# Patient Record
Sex: MALE | Race: BLACK OR AFRICAN AMERICAN | NOT HISPANIC OR LATINO | URBAN - METROPOLITAN AREA
[De-identification: names, ages, dates, MRNs, and addresses within clinical notes are randomized per-mention and may not be internally consistent; named-entity substitution may affect disease eponyms.]

---

## 2021-01-01 ENCOUNTER — EMERGENCY (EMERGENCY)
Facility: HOSPITAL | Age: 66
LOS: 1 days | Discharge: HOME | End: 2021-01-01
Attending: EMERGENCY MEDICINE | Admitting: EMERGENCY MEDICINE
Payer: MEDICARE

## 2021-01-01 ENCOUNTER — INPATIENT (INPATIENT)
Facility: HOSPITAL | Age: 66
LOS: 0 days | End: 2021-06-17
Attending: INTERNAL MEDICINE | Admitting: INTERNAL MEDICINE
Payer: MEDICARE

## 2021-01-01 VITALS
OXYGEN SATURATION: 99 % | DIASTOLIC BLOOD PRESSURE: 105 MMHG | HEART RATE: 73 BPM | HEIGHT: 71 IN | SYSTOLIC BLOOD PRESSURE: 148 MMHG | RESPIRATION RATE: 16 BRPM

## 2021-01-01 VITALS
HEART RATE: 75 BPM | HEIGHT: 71 IN | DIASTOLIC BLOOD PRESSURE: 102 MMHG | OXYGEN SATURATION: 98 % | RESPIRATION RATE: 16 BRPM | SYSTOLIC BLOOD PRESSURE: 157 MMHG | TEMPERATURE: 98 F

## 2021-01-01 VITALS
DIASTOLIC BLOOD PRESSURE: 96 MMHG | OXYGEN SATURATION: 100 % | HEART RATE: 85 BPM | SYSTOLIC BLOOD PRESSURE: 155 MMHG | RESPIRATION RATE: 18 BRPM

## 2021-01-01 VITALS — HEART RATE: 57 BPM | OXYGEN SATURATION: 93 % | RESPIRATION RATE: 12 BRPM

## 2021-01-01 VITALS
HEART RATE: 89 BPM | RESPIRATION RATE: 15 BRPM | HEIGHT: 71 IN | OXYGEN SATURATION: 100 % | TEMPERATURE: 97 F | DIASTOLIC BLOOD PRESSURE: 69 MMHG | SYSTOLIC BLOOD PRESSURE: 100 MMHG

## 2021-01-01 DIAGNOSIS — R00.0 TACHYCARDIA, UNSPECIFIED: ICD-10-CM

## 2021-01-01 LAB
ALBUMIN SERPL ELPH-MCNC: 2 G/DL — LOW (ref 3.3–5)
ALBUMIN SERPL ELPH-MCNC: 2 G/DL — LOW (ref 3.3–5)
ALBUMIN SERPL ELPH-MCNC: 3.3 G/DL — SIGNIFICANT CHANGE UP (ref 3.3–5)
ALP SERPL-CCNC: 111 U/L — SIGNIFICANT CHANGE UP (ref 40–120)
ALP SERPL-CCNC: 131 U/L — HIGH (ref 40–120)
ALP SERPL-CCNC: 160 U/L — HIGH (ref 40–120)
ALT FLD-CCNC: 128 U/L — HIGH (ref 4–41)
ALT FLD-CCNC: 178 U/L — HIGH (ref 4–41)
ALT FLD-CCNC: 394 U/L — HIGH (ref 4–41)
ANION GAP SERPL CALC-SCNC: 18 MMOL/L — HIGH (ref 7–14)
ANION GAP SERPL CALC-SCNC: 31 MMOL/L — HIGH (ref 7–14)
ANION GAP SERPL CALC-SCNC: >35 MMOL/L — HIGH (ref 7–14)
ANISOCYTOSIS BLD QL: SLIGHT — SIGNIFICANT CHANGE UP
APTT BLD: 40.7 SEC — HIGH (ref 27–36.3)
APTT BLD: 48.5 SEC — HIGH (ref 27–36.3)
AST SERPL-CCNC: 128 U/L — HIGH (ref 4–40)
AST SERPL-CCNC: 203 U/L — HIGH (ref 4–40)
AST SERPL-CCNC: 471 U/L — HIGH (ref 4–40)
BASE EXCESS BLDA CALC-SCNC: -17 MMOL/L — LOW (ref -2–2)
BASOPHILS # BLD AUTO: 0.05 K/UL — SIGNIFICANT CHANGE UP (ref 0–0.2)
BASOPHILS NFR BLD AUTO: 0.4 % — SIGNIFICANT CHANGE UP (ref 0–2)
BILIRUB SERPL-MCNC: 0.9 MG/DL — SIGNIFICANT CHANGE UP (ref 0.2–1.2)
BILIRUB SERPL-MCNC: 1.1 MG/DL — SIGNIFICANT CHANGE UP (ref 0.2–1.2)
BILIRUB SERPL-MCNC: 1.7 MG/DL — HIGH (ref 0.2–1.2)
BLD GP AB SCN SERPL QL: NEGATIVE — SIGNIFICANT CHANGE UP
BLOOD GAS ARTERIAL COMPREHENSIVE RESULT: SIGNIFICANT CHANGE UP
BUN SERPL-MCNC: 21 MG/DL — SIGNIFICANT CHANGE UP (ref 7–23)
BUN SERPL-MCNC: 23 MG/DL — SIGNIFICANT CHANGE UP (ref 7–23)
BUN SERPL-MCNC: 24 MG/DL — HIGH (ref 7–23)
BURR CELLS BLD QL SMEAR: PRESENT — SIGNIFICANT CHANGE UP
CALCIUM SERPL-MCNC: 8 MG/DL — LOW (ref 8.4–10.5)
CALCIUM SERPL-MCNC: 8.1 MG/DL — LOW (ref 8.4–10.5)
CALCIUM SERPL-MCNC: 9.5 MG/DL — SIGNIFICANT CHANGE UP (ref 8.4–10.5)
CHLORIDE SERPL-SCNC: 103 MMOL/L — SIGNIFICANT CHANGE UP (ref 98–107)
CHLORIDE SERPL-SCNC: 105 MMOL/L — SIGNIFICANT CHANGE UP (ref 98–107)
CHLORIDE SERPL-SCNC: 97 MMOL/L — LOW (ref 98–107)
CK MB BLD-MCNC: 7 % — HIGH (ref 0–2.5)
CK MB CFR SERPL CALC: 20.9 NG/ML — HIGH
CK SERPL-CCNC: 298 U/L — HIGH (ref 30–200)
CO2 SERPL-SCNC: 12 MMOL/L — LOW (ref 22–31)
CO2 SERPL-SCNC: 18 MMOL/L — LOW (ref 22–31)
CO2 SERPL-SCNC: <7 MMOL/L — CRITICAL LOW (ref 22–31)
CREAT SERPL-MCNC: 1.73 MG/DL — HIGH (ref 0.5–1.3)
CREAT SERPL-MCNC: 2.17 MG/DL — HIGH (ref 0.5–1.3)
CREAT SERPL-MCNC: 2.66 MG/DL — HIGH (ref 0.5–1.3)
EOSINOPHIL # BLD AUTO: 0.03 K/UL — SIGNIFICANT CHANGE UP (ref 0–0.5)
EOSINOPHIL NFR BLD AUTO: 0.2 % — SIGNIFICANT CHANGE UP (ref 0–6)
FIBRINOGEN PPP-MCNC: 359 MG/DL — SIGNIFICANT CHANGE UP (ref 290–520)
GIANT PLATELETS BLD QL SMEAR: PRESENT — SIGNIFICANT CHANGE UP
GLUCOSE SERPL-MCNC: 324 MG/DL — HIGH (ref 70–99)
GLUCOSE SERPL-MCNC: 383 MG/DL — HIGH (ref 70–99)
GLUCOSE SERPL-MCNC: 386 MG/DL — HIGH (ref 70–99)
HCO3 BLDA-SCNC: 11 MMOL/L — LOW (ref 22–26)
HCT VFR BLD CALC: 26.7 % — LOW (ref 39–50)
HCT VFR BLD CALC: 29.1 % — LOW (ref 39–50)
HCT VFR BLD CALC: 38.3 % — LOW (ref 39–50)
HGB BLD-MCNC: 12.3 G/DL — LOW (ref 13–17)
HGB BLD-MCNC: 7.8 G/DL — LOW (ref 13–17)
HGB BLD-MCNC: 8.4 G/DL — LOW (ref 13–17)
IANC: 9.47 K/UL — HIGH (ref 1.5–8.5)
IMM GRANULOCYTES NFR BLD AUTO: 1 % — SIGNIFICANT CHANGE UP (ref 0–1.5)
INR BLD: 2.47 RATIO — HIGH (ref 0.88–1.16)
INR BLD: 3.66 RATIO — HIGH (ref 0.88–1.16)
LG PLATELETS BLD QL AUTO: SLIGHT — SIGNIFICANT CHANGE UP
LYMPHOCYTES # BLD AUTO: 2.9 K/UL — SIGNIFICANT CHANGE UP (ref 1–3.3)
LYMPHOCYTES # BLD AUTO: 21.8 % — SIGNIFICANT CHANGE UP (ref 13–44)
MACROCYTES BLD QL: SLIGHT — SIGNIFICANT CHANGE UP
MAGNESIUM SERPL-MCNC: 3 MG/DL — HIGH (ref 1.6–2.6)
MAGNESIUM SERPL-MCNC: 3.3 MG/DL — HIGH (ref 1.6–2.6)
MANUAL SMEAR VERIFICATION: SIGNIFICANT CHANGE UP
MCHC RBC-ENTMCNC: 28.9 GM/DL — LOW (ref 32–36)
MCHC RBC-ENTMCNC: 29.2 GM/DL — LOW (ref 32–36)
MCHC RBC-ENTMCNC: 30.9 PG — SIGNIFICANT CHANGE UP (ref 27–34)
MCHC RBC-ENTMCNC: 31.2 PG — SIGNIFICANT CHANGE UP (ref 27–34)
MCHC RBC-ENTMCNC: 31.3 PG — SIGNIFICANT CHANGE UP (ref 27–34)
MCHC RBC-ENTMCNC: 32.1 GM/DL — SIGNIFICANT CHANGE UP (ref 32–36)
MCV RBC AUTO: 107 FL — HIGH (ref 80–100)
MCV RBC AUTO: 107.2 FL — HIGH (ref 80–100)
MCV RBC AUTO: 97.2 FL — SIGNIFICANT CHANGE UP (ref 80–100)
MONOCYTES # BLD AUTO: 0.72 K/UL — SIGNIFICANT CHANGE UP (ref 0–0.9)
MONOCYTES NFR BLD AUTO: 5.4 % — SIGNIFICANT CHANGE UP (ref 2–14)
MYELOCYTES NFR BLD: 0.9 % — HIGH (ref 0–0)
NEUTROPHILS # BLD AUTO: 9.47 K/UL — HIGH (ref 1.8–7.4)
NEUTROPHILS NFR BLD AUTO: 71.2 % — SIGNIFICANT CHANGE UP (ref 43–77)
NEUTS BAND # BLD: 0.9 % — SIGNIFICANT CHANGE UP (ref 0–6)
NRBC # BLD: 0 /100 WBCS — SIGNIFICANT CHANGE UP
NRBC # BLD: 0 /100 WBCS — SIGNIFICANT CHANGE UP
NRBC # BLD: 2 /100 WBCS — SIGNIFICANT CHANGE UP
NRBC # BLD: 2 /100 — HIGH (ref 0–0)
NRBC # FLD: 0 K/UL — SIGNIFICANT CHANGE UP
NRBC # FLD: 0.1 K/UL — HIGH
NRBC # FLD: 0.21 K/UL — HIGH
PCO2 BLDA: 32 MMHG — LOW (ref 35–48)
PH BLDA: 7.14 — CRITICAL LOW (ref 7.35–7.45)
PHOSPHATE SERPL-MCNC: 12 MG/DL — HIGH (ref 2.5–4.5)
PHOSPHATE SERPL-MCNC: 9.2 MG/DL — HIGH (ref 2.5–4.5)
PLAT MORPH BLD: ABNORMAL
PLATELET # BLD AUTO: 231 K/UL — SIGNIFICANT CHANGE UP (ref 150–400)
PLATELET # BLD AUTO: 93 K/UL — LOW (ref 150–400)
PLATELET # BLD AUTO: 99 K/UL — LOW (ref 150–400)
PLATELET COUNT - ESTIMATE: ABNORMAL
PO2 BLDA: 210 MMHG — HIGH (ref 83–108)
POIKILOCYTOSIS BLD QL AUTO: SIGNIFICANT CHANGE UP
POLYCHROMASIA BLD QL SMEAR: SLIGHT — SIGNIFICANT CHANGE UP
POTASSIUM SERPL-MCNC: 4.8 MMOL/L — SIGNIFICANT CHANGE UP (ref 3.5–5.3)
POTASSIUM SERPL-MCNC: 5 MMOL/L — SIGNIFICANT CHANGE UP (ref 3.5–5.3)
POTASSIUM SERPL-MCNC: 6.1 MMOL/L — HIGH (ref 3.5–5.3)
POTASSIUM SERPL-SCNC: 4.8 MMOL/L — SIGNIFICANT CHANGE UP (ref 3.5–5.3)
POTASSIUM SERPL-SCNC: 5 MMOL/L — SIGNIFICANT CHANGE UP (ref 3.5–5.3)
POTASSIUM SERPL-SCNC: 6.1 MMOL/L — HIGH (ref 3.5–5.3)
PROT SERPL-MCNC: 4.1 G/DL — LOW (ref 6–8.3)
PROT SERPL-MCNC: 4.6 G/DL — LOW (ref 6–8.3)
PROT SERPL-MCNC: 7 G/DL — SIGNIFICANT CHANGE UP (ref 6–8.3)
PROTHROM AB SERPL-ACNC: 27.2 SEC — HIGH (ref 10.6–13.6)
PROTHROM AB SERPL-ACNC: 39.2 SEC — HIGH (ref 10.6–13.6)
RBC # BLD: 2.49 M/UL — LOW (ref 4.2–5.8)
RBC # BLD: 2.72 M/UL — LOW (ref 4.2–5.8)
RBC # BLD: 3.94 M/UL — LOW (ref 4.2–5.8)
RBC # FLD: 12.2 % — SIGNIFICANT CHANGE UP (ref 10.3–14.5)
RBC # FLD: 12.7 % — SIGNIFICANT CHANGE UP (ref 10.3–14.5)
RBC # FLD: 13 % — SIGNIFICANT CHANGE UP (ref 10.3–14.5)
RBC BLD AUTO: ABNORMAL
RH IG SCN BLD-IMP: POSITIVE — SIGNIFICANT CHANGE UP
SAO2 % BLDA: 98.5 % — SIGNIFICANT CHANGE UP (ref 95–99)
SARS-COV-2 RNA SPEC QL NAA+PROBE: SIGNIFICANT CHANGE UP
SMUDGE CELLS # BLD: PRESENT — SIGNIFICANT CHANGE UP
SODIUM SERPL-SCNC: 133 MMOL/L — LOW (ref 135–145)
SODIUM SERPL-SCNC: 145 MMOL/L — SIGNIFICANT CHANGE UP (ref 135–145)
SODIUM SERPL-SCNC: 148 MMOL/L — HIGH (ref 135–145)
TROPONIN T, HIGH SENSITIVITY RESULT: 2687 NG/L — CRITICAL HIGH
VARIANT LYMPHS # BLD: 2.6 % — SIGNIFICANT CHANGE UP (ref 0–6)
WBC # BLD: 11.78 K/UL — HIGH (ref 3.8–10.5)
WBC # BLD: 12.15 K/UL — HIGH (ref 3.8–10.5)
WBC # BLD: 13.3 K/UL — HIGH (ref 3.8–10.5)
WBC # FLD AUTO: 11.78 K/UL — HIGH (ref 3.8–10.5)
WBC # FLD AUTO: 12.15 K/UL — HIGH (ref 3.8–10.5)
WBC # FLD AUTO: 13.3 K/UL — HIGH (ref 3.8–10.5)

## 2021-01-01 PROCEDURE — 93281 PM DEVICE PROGR EVAL MULTI: CPT | Mod: 26

## 2021-01-01 PROCEDURE — 74178 CT ABD&PLV WO CNTR FLWD CNTR: CPT | Mod: 26

## 2021-01-01 PROCEDURE — 93010 ELECTROCARDIOGRAM REPORT: CPT | Mod: GC

## 2021-01-01 PROCEDURE — 99283 EMERGENCY DEPT VISIT LOW MDM: CPT

## 2021-01-01 PROCEDURE — 33967 INSERT I-AORT PERCUT DEVICE: CPT

## 2021-01-01 PROCEDURE — 71045 X-RAY EXAM CHEST 1 VIEW: CPT | Mod: 26

## 2021-01-01 PROCEDURE — 99291 CRITICAL CARE FIRST HOUR: CPT | Mod: 25,GC

## 2021-01-01 PROCEDURE — 71260 CT THORAX DX C+: CPT | Mod: 26

## 2021-01-01 PROCEDURE — 99223 1ST HOSP IP/OBS HIGH 75: CPT | Mod: GC

## 2021-01-01 PROCEDURE — 99233 SBSQ HOSP IP/OBS HIGH 50: CPT

## 2021-01-01 PROCEDURE — 99283 EMERGENCY DEPT VISIT LOW MDM: CPT | Mod: GC

## 2021-01-01 PROCEDURE — 93306 TTE W/DOPPLER COMPLETE: CPT | Mod: 26

## 2021-01-01 PROCEDURE — 73590 X-RAY EXAM OF LOWER LEG: CPT | Mod: 26,LT

## 2021-01-01 RX ORDER — METOPROLOL TARTRATE 50 MG
50 TABLET ORAL ONCE
Refills: 0 | Status: COMPLETED | OUTPATIENT
Start: 2021-01-01 | End: 2021-01-01

## 2021-01-01 RX ORDER — SODIUM BICARBONATE 1 MEQ/ML
0.12 SYRINGE (ML) INTRAVENOUS
Qty: 75 | Refills: 0 | Status: DISCONTINUED | OUTPATIENT
Start: 2021-01-01 | End: 2021-01-01

## 2021-01-01 RX ORDER — PANTOPRAZOLE SODIUM 20 MG/1
80 TABLET, DELAYED RELEASE ORAL ONCE
Refills: 0 | Status: COMPLETED | OUTPATIENT
Start: 2021-01-01 | End: 2021-01-01

## 2021-01-01 RX ORDER — NOREPINEPHRINE BITARTRATE/D5W 8 MG/250ML
0.05 PLASTIC BAG, INJECTION (ML) INTRAVENOUS
Qty: 8 | Refills: 0 | Status: DISCONTINUED | OUTPATIENT
Start: 2021-01-01 | End: 2021-01-01

## 2021-01-01 RX ORDER — EPINEPHRINE 0.3 MG/.3ML
1 INJECTION INTRAMUSCULAR; SUBCUTANEOUS ONCE
Refills: 0 | Status: COMPLETED | OUTPATIENT
Start: 2021-01-01 | End: 2021-01-01

## 2021-01-01 RX ORDER — FENTANYL CITRATE 50 UG/ML
0.5 INJECTION INTRAVENOUS
Qty: 2500 | Refills: 0 | Status: DISCONTINUED | OUTPATIENT
Start: 2021-01-01 | End: 2021-01-01

## 2021-01-01 RX ORDER — PANTOPRAZOLE SODIUM 20 MG/1
40 TABLET, DELAYED RELEASE ORAL DAILY
Refills: 0 | Status: DISCONTINUED | OUTPATIENT
Start: 2021-01-01 | End: 2021-01-01

## 2021-01-01 RX ORDER — DEXTROSE 50 % IN WATER 50 %
50 SYRINGE (ML) INTRAVENOUS ONCE
Refills: 0 | Status: COMPLETED | OUTPATIENT
Start: 2021-01-01 | End: 2021-01-01

## 2021-01-01 RX ORDER — AMIODARONE HYDROCHLORIDE 400 MG/1
1 TABLET ORAL
Qty: 450 | Refills: 0 | Status: DISCONTINUED | OUTPATIENT
Start: 2021-01-01 | End: 2021-01-01

## 2021-01-01 RX ORDER — IBUPROFEN 200 MG
400 TABLET ORAL ONCE
Refills: 0 | Status: COMPLETED | OUTPATIENT
Start: 2021-01-01 | End: 2021-01-01

## 2021-01-01 RX ORDER — CHLORHEXIDINE GLUCONATE 213 G/1000ML
15 SOLUTION TOPICAL EVERY 12 HOURS
Refills: 0 | Status: DISCONTINUED | OUTPATIENT
Start: 2021-01-01 | End: 2021-01-01

## 2021-01-01 RX ORDER — METOPROLOL TARTRATE 50 MG
5 TABLET ORAL
Refills: 0 | Status: DISCONTINUED | OUTPATIENT
Start: 2021-01-01 | End: 2021-01-01

## 2021-01-01 RX ORDER — GLUCAGON INJECTION, SOLUTION 0.5 MG/.1ML
5 INJECTION, SOLUTION SUBCUTANEOUS ONCE
Refills: 0 | Status: DISCONTINUED | OUTPATIENT
Start: 2021-01-01 | End: 2021-01-01

## 2021-01-01 RX ORDER — EPINEPHRINE 0.3 MG/.3ML
2 INJECTION INTRAMUSCULAR; SUBCUTANEOUS
Qty: 4 | Refills: 0 | Status: DISCONTINUED | OUTPATIENT
Start: 2021-01-01 | End: 2021-01-01

## 2021-01-01 RX ORDER — INSULIN HUMAN 100 [IU]/ML
10 INJECTION, SOLUTION SUBCUTANEOUS ONCE
Refills: 0 | Status: DISCONTINUED | OUTPATIENT
Start: 2021-01-01 | End: 2021-01-01

## 2021-01-01 RX ORDER — AMIODARONE HYDROCHLORIDE 400 MG/1
1 TABLET ORAL
Qty: 900 | Refills: 0 | Status: DISCONTINUED | OUTPATIENT
Start: 2021-01-01 | End: 2021-01-01

## 2021-01-01 RX ORDER — METOPROLOL TARTRATE 50 MG
5 TABLET ORAL ONCE
Refills: 0 | Status: DISCONTINUED | OUTPATIENT
Start: 2021-01-01 | End: 2021-01-01

## 2021-01-01 RX ORDER — SODIUM CHLORIDE 9 MG/ML
1000 INJECTION INTRAMUSCULAR; INTRAVENOUS; SUBCUTANEOUS ONCE
Refills: 0 | Status: COMPLETED | OUTPATIENT
Start: 2021-01-01 | End: 2021-01-01

## 2021-01-01 RX ORDER — HYDROMORPHONE HYDROCHLORIDE 2 MG/ML
0.25 INJECTION INTRAMUSCULAR; INTRAVENOUS; SUBCUTANEOUS
Refills: 0 | Status: DISCONTINUED | OUTPATIENT
Start: 2021-01-01 | End: 2021-01-01

## 2021-01-01 RX ORDER — AZTREONAM 2 G
1 VIAL (EA) INJECTION
Qty: 14 | Refills: 0
Start: 2021-01-01

## 2021-01-01 RX ORDER — PANTOPRAZOLE SODIUM 20 MG/1
8 TABLET, DELAYED RELEASE ORAL
Qty: 80 | Refills: 0 | Status: DISCONTINUED | OUTPATIENT
Start: 2021-01-01 | End: 2021-01-01

## 2021-01-01 RX ORDER — NOREPINEPHRINE BITARTRATE/D5W 8 MG/250ML
0.05 PLASTIC BAG, INJECTION (ML) INTRAVENOUS
Qty: 32 | Refills: 0 | Status: DISCONTINUED | OUTPATIENT
Start: 2021-01-01 | End: 2021-01-01

## 2021-01-01 RX ORDER — SODIUM BICARBONATE 1 MEQ/ML
50 SYRINGE (ML) INTRAVENOUS ONCE
Refills: 0 | Status: COMPLETED | OUTPATIENT
Start: 2021-01-01 | End: 2021-01-01

## 2021-01-01 RX ORDER — VASOPRESSIN 20 [USP'U]/ML
0.02 INJECTION INTRAVENOUS
Qty: 50 | Refills: 0 | Status: DISCONTINUED | OUTPATIENT
Start: 2021-01-01 | End: 2021-01-01

## 2021-01-01 RX ORDER — FENTANYL CITRATE 50 UG/ML
1 INJECTION INTRAVENOUS
Qty: 5000 | Refills: 0 | Status: DISCONTINUED | OUTPATIENT
Start: 2021-01-01 | End: 2021-01-01

## 2021-01-01 RX ORDER — AMIODARONE HYDROCHLORIDE 400 MG/1
300 TABLET ORAL ONCE
Refills: 0 | Status: COMPLETED | OUTPATIENT
Start: 2021-01-01 | End: 2021-01-01

## 2021-01-01 RX ORDER — FENTANYL CITRATE 50 UG/ML
50 INJECTION INTRAVENOUS ONCE
Refills: 0 | Status: DISCONTINUED | OUTPATIENT
Start: 2021-01-01 | End: 2021-01-01

## 2021-01-01 RX ORDER — TETANUS TOXOID, REDUCED DIPHTHERIA TOXOID AND ACELLULAR PERTUSSIS VACCINE, ADSORBED 5; 2.5; 8; 8; 2.5 [IU]/.5ML; [IU]/.5ML; UG/.5ML; UG/.5ML; UG/.5ML
0.5 SUSPENSION INTRAMUSCULAR ONCE
Refills: 0 | Status: COMPLETED | OUTPATIENT
Start: 2021-01-01 | End: 2021-01-01

## 2021-01-01 RX ORDER — CALCIUM GLUCONATE 100 MG/ML
2 VIAL (ML) INTRAVENOUS ONCE
Refills: 0 | Status: COMPLETED | OUTPATIENT
Start: 2021-01-01 | End: 2021-01-01

## 2021-01-01 RX ORDER — INSULIN HUMAN 100 [IU]/ML
100 INJECTION, SOLUTION SUBCUTANEOUS ONCE
Refills: 0 | Status: DISCONTINUED | OUTPATIENT
Start: 2021-01-01 | End: 2021-01-01

## 2021-01-01 RX ADMIN — Medication 400 MILLIGRAM(S): at 07:37

## 2021-01-01 RX ADMIN — PANTOPRAZOLE SODIUM 80 MILLIGRAM(S): 20 TABLET, DELAYED RELEASE ORAL at 12:32

## 2021-01-01 RX ADMIN — FENTANYL CITRATE 5 MICROGRAM(S)/KG/HR: 50 INJECTION INTRAVENOUS at 12:13

## 2021-01-01 RX ADMIN — Medication 5 MILLIGRAM(S): at 06:12

## 2021-01-01 RX ADMIN — Medication 50 MILLIGRAM(S): at 09:24

## 2021-01-01 RX ADMIN — Medication 200 GRAM(S): at 09:00

## 2021-01-01 RX ADMIN — Medication 9.38 MICROGRAM(S)/KG/MIN: at 08:49

## 2021-01-01 RX ADMIN — Medication 50 MILLIEQUIVALENT(S): at 08:22

## 2021-01-01 RX ADMIN — FENTANYL CITRATE 5 MICROGRAM(S)/KG/HR: 50 INJECTION INTRAVENOUS at 09:13

## 2021-01-01 RX ADMIN — PANTOPRAZOLE SODIUM 10 MG/HR: 20 TABLET, DELAYED RELEASE ORAL at 15:26

## 2021-01-01 RX ADMIN — VASOPRESSIN 1.2 UNIT(S)/MIN: 20 INJECTION INTRAVENOUS at 12:13

## 2021-01-01 RX ADMIN — TETANUS TOXOID, REDUCED DIPHTHERIA TOXOID AND ACELLULAR PERTUSSIS VACCINE, ADSORBED 0.5 MILLILITER(S): 5; 2.5; 8; 8; 2.5 SUSPENSION INTRAMUSCULAR at 07:36

## 2021-01-01 RX ADMIN — Medication 150 MEQ/KG/HR: at 15:26

## 2021-01-01 RX ADMIN — FENTANYL CITRATE 50 MICROGRAM(S): 50 INJECTION INTRAVENOUS at 06:35

## 2021-01-01 RX ADMIN — Medication 4.69 MICROGRAM(S)/KG/MIN: at 12:32

## 2021-01-01 RX ADMIN — Medication 50 GRAM(S): at 09:13

## 2021-01-01 RX ADMIN — AMIODARONE HYDROCHLORIDE 300 MILLIGRAM(S): 400 TABLET ORAL at 06:55

## 2021-01-01 RX ADMIN — Medication 50 MILLIEQUIVALENT(S): at 09:05

## 2021-01-01 RX ADMIN — EPINEPHRINE 1 MILLIGRAM(S): 0.3 INJECTION INTRAMUSCULAR; SUBCUTANEOUS at 08:20

## 2021-01-01 RX ADMIN — SODIUM CHLORIDE 1000 MILLILITER(S): 9 INJECTION INTRAMUSCULAR; INTRAVENOUS; SUBCUTANEOUS at 06:12

## 2021-05-30 NOTE — ED PROVIDER NOTE - PATIENT PORTAL LINK FT
You can access the FollowMyHealth Patient Portal offered by [facility] by registering at the following website: [email or web address]. By joining [employer]’s FollowMyHealth portal, you will also be able to view your health information using other applications (apps) compatible with our system.

## 2021-05-30 NOTE — ED ADULT TRIAGE NOTE - CHIEF COMPLAINT QUOTE
patient was working with a hammer on thursday, a metal object hit his left lower leg, and it's been hurting every since.  patient has abrasion to left lower leg

## 2021-05-30 NOTE — ED ADULT NURSE NOTE - NSIMPLEMENTINTERV_GEN_ALL_ED
Implemented All Universal Safety Interventions:  [place] to call system. Call bell, personal items and telephone within reach. Instruct patient to call for assistance. Room bathroom lighting operational. Non-slip footwear when patient is off stretcher. Physically safe environment: no spills, clutter or unnecessary equipment. Stretcher in lowest position, wheels locked, appropriate side rails in place.

## 2021-05-30 NOTE — ED ADULT NURSE REASSESSMENT NOTE - NS ED NURSE REASSESS COMMENT FT1
pt in NAD, awaiting xr results at this time. vs as noted. pt noted to be hypertensive, denies headache, dizziness, lightheadedness. MD made aware. pt states he hasn't taken prescribed HTN meds this AM. awaiting medication order. will continue to monitor.

## 2021-05-30 NOTE — ED ADULT NURSE NOTE - OBJECTIVE STATEMENT
patient is a 65y male, A&Ox3, ambulatory, had a hammer fall and hit his leg  and has a cut on the mid anterior left leg x 4 days, wife has been cleaning the wound and reports it has been healing. pain worse when ambulating. patient is able to ambulate independently despite injury. Respirations even and unlabored. Stretcher in lowest position, wheels locked, appropiate side rails in place. Awaiting xray.

## 2021-05-30 NOTE — ED PROVIDER NOTE - PROGRESS NOTE DETAILS
Yonny Aguilar, DO PGY-1: BP improved after home meds given, will discharge home pt asking to go home, Given BP meds that he missed today. BP improved. Xr wnl, wet read placed. pt to be dc home.

## 2021-05-30 NOTE — ED PROVIDER NOTE - NSFOLLOWUPINSTRUCTIONS_ED_ALL_ED_FT
You were seen in the Emergency Department for leg pain.    Follow up with your primary care provider within 3-5 days.     You may take 400mg Ibuprofen (Advil) once every 8 hours as needed for pain for the next five days. See medication label for warnings and use instructions.    If you have new or worsening leg pain, if you develop fever, chills, shortness of breath nausea, vomiting, new or worsening pain, or if you have any new symptoms return to the Emergency Department.

## 2021-05-30 NOTE — ED PROVIDER NOTE - OBJECTIVE STATEMENT
Dr Barba  64yo M PMHx HTN, CAD, HLD, s/p 3 stented coronary arteries,  pw pain of the left anterior leg. pt was using a hammer  and a chisel, hammer fell and hit his leg. Noted bleeding on pants, had a cut on the mid anterior leg x 4 days ago.  Wife has been cleaning would. Endorse pain bairon when walking. no numbness or weakness. no other injuries. unk last tetanus.

## 2021-05-30 NOTE — ED PROVIDER NOTE - PHYSICAL EXAMINATION
Dr Barba  well appearing male. AO3   able to ambulate wo difficulty.   a vertical small superficial abrasion on the anterior of the left leg distal to knee, dried, no bleeding.  no discharge no erythema no fluctuance.  no leg swelling or calf pain Dr Barba  well appearing male. AO3   able to ambulate wo difficulty.   a vertical small superficial abrasion on the anterior of the left leg distal to knee, dried, no bleeding.  no discharge no erythema no fluctuance.  no leg swelling or calf pain    Yonny Aguilar DO PGY-1:  CONSTITUTIONAL: well-appearing, in NAD  SKIN: 1cm superficial abrasion over L anterior mid-tibia, no bleeding, no purulence, no erythema, no edema  HEAD: NCAT  EXT: Full ROM, no bony tenderness, no pedal edema, no calf tenderness, minimal tenderness to palpation  NEURO: normal motor. normal sensory. Normal gait, pt able to ambulate w/o assistance or difficulty  PSYCH: Cooperative, appropriate.

## 2021-05-30 NOTE — ED PROVIDER NOTE - PMH
CAD (Coronary Artery Disease)    Essential Hypertension    Hyperlipidemia    Myocardial infarct, old  6/13/11 STEMI, 08/2010 STEMI

## 2021-06-03 NOTE — ED PROVIDER NOTE - OBJECTIVE STATEMENT
65 year old male with left anterior leg pain. patient struck in the leg with hammer 4 days. ago.  small abrasion. no other redness or swelling. sensation intact. no fever. no other complaints

## 2021-06-03 NOTE — ED PROVIDER NOTE - NEUROLOGICAL, MLM
P.T Note:

P.T evaluation completed and tx initiated S/P R Ankle ORIF. See P.T evaluation for current 
functional status. Alert and oriented, no focal deficits, no motor or sensory deficits.

## 2021-06-03 NOTE — ED PROVIDER NOTE - PATIENT PORTAL LINK FT
You can access the FollowMyHealth Patient Portal offered by [facility] by registering at the following website: [email or web address]. By joining [employer]’s FollowMyHealth portal, you will also be able to view your health information using other applications (apps) compatible with our system.

## 2021-06-03 NOTE — ED PROVIDER NOTE - CLINICAL SUMMARY MEDICAL DECISION MAKING FREE TEXT BOX
small abrasion to left leg. no surrounding erythma. will give abx with instruction to take them if redness occurs

## 2021-06-03 NOTE — ED ADULT TRIAGE NOTE - CHIEF COMPLAINT QUOTE
Pt. c/o pain to left LE. States he was seen on May 30th for injury to area. Small lump noted to area with slight redness noted.

## 2021-06-17 NOTE — CONSULT NOTE ADULT - SUBJECTIVE AND OBJECTIVE BOX
CHIEF COMPLAINT:    HPI:  64yo M w/ h/o HFrEF (EF 15% 2013), HTN, HLD, CAD s/p SHAMA x3, AICD, on Xarelto (since 2/2021) p/w diffuse abdominal discomfort since the morning prior to admission. At time of encounter pt intubated and sedated. Per chart review pt w/ h/o C in 2013 which revealed severe LV systolic dysfunction and 100% stenosis of his RPDA. Pt underwent stenting and was discharged on a Life Vest. Following discharge the patient was shocked by his Life Vest, so he was transitioned to an AICD ([employer]). Per family at bedside (daughters and girlfriend) he had complained of abdominal pain the weekend prior to admission after "drinking juice" associated with poor PO intake.     IN THE ED: During transport, pt reportedly noted to be in SVT to 174 on tele; personal review of EKG at that time w/ VT. Pt given 5mg metoprolol at that time. Per ED note: Patient's arm started twitching he made gasping respirations and then suddenly became hypotensive. Patient cardioverted @100j cardioversion attempted with recovery of paced rhythm but with no pulse with sudden loss of respirations/Mental status CPR was started Patient was intubated and patient received 3mg epi x3, amp bicarb and atropine given with ROSC obtained after approx 15min). Patient still hypotensive so started on norepi gtt w/ fent gtt for sedation and 300mg amio for refractory VT/VF Of note patient AICD fired twice without conversion despite amio and with abd pain n,v diuretic hx 2g of mag given.  Patient continues to have episodes of arrest abated by EPI and increasing drip. MICU and CCU consulted given massive pre cpr troponin spill. patient accepted to CCU. Central line placed for additional access (of note Left IJ thrombus seen on pre-CVL US) and additional pressors (norepi/melanie).    Following ROSC in the ED, pt taken by CCU immediately to the cath lab for balloon pump placement given cardiogenic shock. At time of balloon pump placement blood was noted to be very "light/pale". Hgb at that time was found to be significantly reduced relative to admission labs (12 --> 7), so pt was taken for urgent CT chest/abd/pelvis w/ IV contrast to eval for possible bleeding. CT significant for: Segment 4 hepatic laceration and additional focus of intrahepatic contrast extravasation. No subcapsular hematoma. No hemoperitoneum or pneumoperitoneum. Right lung pulmonary contusion/hemorrhage. No hemothorax or pneumothorax. Small hemopericardium. Pancreatic body parenchymal hypoenhancement with peripancreatic fat stranding, concerning for pancreatic injury. Surgery, GI, and IR consulted.         (17 Jun 2021 11:45)      PAST MEDICAL & SURGICAL HISTORY:  Essential Hypertension    CAD (Coronary Artery Disease)    Hyperlipidemia    Myocardial infarct, old  6/13/11 STEMI, 08/2010 STEMI    Stented coronary artery  1 Stent 06/13/11, 2 stents 8/17/2010            PREVIOUS DIAGNOSTIC TESTING:    [ ] Echocardiogram:  [ ]  Catheterization:  [ ] Stress Test:  	    MEDICATIONS:  MEDICATIONS  (STANDING):  chlorhexidine 0.12% Liquid 15 milliLiter(s) Oral Mucosa every 12 hours  fentaNYL   Infusion. 0.5 MICROgram(s)/kG/Hr (5 mL/Hr) IV Continuous <Continuous>  norepinephrine Infusion 0.05 MICROgram(s)/kG/Min (4.69 mL/Hr) IV Continuous <Continuous>  pantoprazole Infusion 8 mG/Hr (10 mL/Hr) IV Continuous <Continuous>  sodium bicarbonate  Infusion 0.1 mEq/kG/Hr (123 mL/Hr) IV Continuous <Continuous>  vasopressin Infusion 0.02 Unit(s)/Min (1.2 mL/Hr) IV Continuous <Continuous>      FAMILY HISTORY:  No pertinent family history in first degree relatives        SOCIAL HISTORY:    [ ] Non-smoker  [ ] Smoker  [ ] Alcohol    Allergies    No Known Allergies    Intolerances    	    REVIEW OF SYSTEMS:  CONSTITUTIONAL: No fever, weight loss, or fatigue  EYES: No eye pain, visual disturbances, or discharge  ENMT:  No difficulty hearing, tinnitus, vertigo; No sinus or throat pain  NECK: No pain or stiffness  RESPIRATORY: No cough, wheezing, chills or hemoptysis; No Shortness of Breath  CARDIOVASCULAR: No chest pain, palpitations, passing out, dizziness, or leg swelling  GASTROINTESTINAL: No abdominal or epigastric pain. No nausea, vomiting, or hematemesis; No diarrhea or constipation. No melena or hematochezia.  GENITOURINARY: No dysuria, frequency, hematuria, or incontinence  NEUROLOGICAL: No headaches, memory loss, loss of strength, numbness, or tremors  SKIN: No itching, burning, rashes, or lesions   	    [ ] All others negative	  [ ] Unable to obtain    PHYSICAL EXAM:  T(C): 33.9 (06-17-21 @ 11:15), Max: 36.3 (06-17-21 @ 05:26)  HR: 65 (06-17-21 @ 13:00) (39 - 170)  BP: 98/84 (06-17-21 @ 09:14) (34/13 - 170/87)  RR: 24 (06-17-21 @ 13:00) (15 - 30)  SpO2: 100% (06-17-21 @ 13:00) (96% - 100%)  Wt(kg): --  I&O's Summary      Appearance: Normal	  Psychiatry: A & O x 3, Mood & affect appropriate  HEENT:   Normal oral mucosa, PERRL, EOMI	  Lymphatic: No lymphadenopathy  Cardiovascular: Normal S1 S2,RRR, No JVD, No murmurs  Respiratory: Lungs clear to auscultation	  Gastrointestinal:  Soft, Non-tender, + BS	  Skin: No rashes, No ecchymoses, No cyanosis	  Neurologic: Non-focal  Extremities: Normal range of motion, No clubbing, cyanosis or edema  Vascular: Peripheral pulses palpable 2+ bilaterally    TELEMETRY: 	    ECG:  	  RADIOLOGY:  OTHER: 	  	  LABS:	 	    CARDIAC MARKERS:                                  8.4    12.15 )-----------( 99       ( 17 Jun 2021 12:14 )             29.1     06-17    145  |  103  |  23  ----------------------------<  324<H>  6.1<H>   |  <7<LL>  |  2.66<H>    Ca    8.0<L>      17 Jun 2021 12:14  Phos  12.0     06-17  Mg     3.3     06-17    TPro  4.6<L>  /  Alb  2.0<L>  /  TBili  1.1  /  DBili  x   /  AST  471<H>  /  ALT  394<H>  /  AlkPhos  131<H>  06-17    PT/INR - ( 17 Jun 2021 06:26 )   PT: 27.2 sec;   INR: 2.47 ratio         PTT - ( 17 Jun 2021 06:26 )  PTT:40.7 sec  proBNP:   Lipid Profile:   HgA1c:   TSH:     ASSESSMENT/PLAN: 	    MEDICATIONS  (STANDING):  chlorhexidine 0.12% Liquid 15 milliLiter(s) Oral Mucosa every 12 hours  fentaNYL   Infusion. 0.5 MICROgram(s)/kG/Hr (5 mL/Hr) IV Continuous <Continuous>  norepinephrine Infusion 0.05 MICROgram(s)/kG/Min (4.69 mL/Hr) IV Continuous <Continuous>  pantoprazole Infusion 8 mG/Hr (10 mL/Hr) IV Continuous <Continuous>  sodium bicarbonate  Infusion 0.1 mEq/kG/Hr (123 mL/Hr) IV Continuous <Continuous>  vasopressin Infusion 0.02 Unit(s)/Min (1.2 mL/Hr) IV Continuous <Continuous>

## 2021-06-17 NOTE — CONSULT NOTE ADULT - ATTENDING COMMENTS
66yo M w/ h/o HFrEF (EF 15% 2013), HTN, HLD, CAD s/p SHAMA x3, AICD, on Xarelto (since 2/2021) p/w diffuse abdominal discomfort since the morning prior to admission. Hospital course c/b VT and cardiac arrest requiring intubation and pressor support. Patient admitted to CCU for further management. ICD interrogation revealed episodes of VF with ICD shocks (see ICD interrogation note). s/p IABP for HD support. Will follow.

## 2021-06-17 NOTE — ED PROVIDER NOTE - CLINICAL SUMMARY MEDICAL DECISION MAKING FREE TEXT BOX
64yo M PMHx HFrEF (EF 15% 2013), HTN, HLD, CAD s/p SHAMA x3, AICD, afib on xarelto p/w abdominal pain found to have SVT and then coded x2.

## 2021-06-17 NOTE — DISCHARGE NOTE FOR THE EXPIRED PATIENT - HOSPITAL COURSE
64yo M w/ h/o HFrEF (EF 15% 2013), HTN, HLD, CAD s/p SHAMA x3, AICD, on Xarelto (since 2/2021) p/w diffuse abdominal discomfort since the morning prior to admission. At time of encounter pt intubated and sedated. Per chart review pt w/ h/o C in 2013 which revealed severe LV systolic dysfunction and 100% stenosis of his RPDA. Pt underwent stenting and was discharged on a Life Vest. Following discharge the patient was shocked by his Life Vest, so he was transitioned to an AICD ([employer]). Per family at bedside (daughters and girlfriend) he had complained of abdominal pain the weekend prior to admission after "drinking juice" associated with poor PO intake.     IN THE ED: During transport, pt reportedly noted to be in SVT to 174 on tele; personal review of EKG at that time w/ VT. Pt given 5mg metoprolol at that time. Per ED note: Patient's arm started twitching he made gasping respirations and then suddenly became hypotensive. Patient cardioverted @100j cardioversion attempted with recovery of paced rhythm but with no pulse with sudden loss of respirations/Mental status CPR was started Patient was intubated and patient received 3mg epi x3, amp bicarb and atropine given with ROSC obtained after approx 15min). Patient still hypotensive so started on norepi gtt w/ fent gtt for sedation and 300mg amio for refractory VT/VF Of note patient AICD fired twice without conversion despite amio and with abd pain n,v diuretic hx 2g of mag given.  Patient continues to have episodes of arrest abated by EPI and increasing drip. MICU and CCU consulted given massive pre cpr troponin spill. patient accepted to CCU. Central line placed for additional access (of note Left IJ thrombus seen on pre-CVL US) and additional pressors (norepi/melanie).    Following ROSC in the ED, pt taken by CCU immediately to the cath lab for balloon pump placement given cardiogenic shock. At time of balloon pump placement blood was noted to be very "light/pale". Hgb at that time was found to be significantly reduced relative to admission labs (12 --> 7), so pt was taken for urgent CT chest/abd/pelvis w/ IV contrast to eval for possible bleeding. CT significant for: Segment 4 hepatic laceration and additional focus of intrahepatic contrast extravasation. No subcapsular hematoma. No hemoperitoneum or pneumoperitoneum. Right lung pulmonary contusion/hemorrhage. No hemothorax or pneumothorax. Small hemopericardium. Pancreatic body parenchymal hypoenhancement with peripancreatic fat stranding, concerning for pancreatic injury. OGT placed with dark red bloody output. Surgery, GI, and IR consulted. Per surgery, GI, and IR urgent intervention deferred as pt not a surgical candidate.    In the CCU pt received multiple transfusions of PRBC, plts, and FFP. During CCU course pt coded again, requiring multiple rounds of chest compressions, epinephrine, and sodium bicarbonate. Pt started on bicarb gtt for severe lactic acidosis and metabolic acidosis. Despite these measures pt continued to have increasing pressor requirements and signs of multiorgan failure (uptrending INR, Cr). After multiple GOC discussions with the family, they ultimately opted to make the pt DNR and pursue comfort care. Pressors were capped at that time.    Called by bedside by nurse for PEA. On physical exam, no spontaneous movements were present. Patient did not respond to verbal or physical stimuli. Pupils were mid-dilated and fixed, without light reflex and corneal reflex. No breath sounds were appreciated over either lung field. No carotid pulses were palpable. No heart sounds were auscultated. Patient pronounced dead at 18:17. Attending notified.  Family was notified (at bedside). Family ___ autopsy.

## 2021-06-17 NOTE — ED PROVIDER NOTE - NSFOLLOWUPINSTRUCTIONS_ED_ALL_ED_FT
12.3   13.30 )-----------( 231      ( 17 Jun 2021 06:26 )             38.3   Mean Cell Volume: 97.2 fL (06-17-21 @ 06:26)  Auto Neutrophil %: 71.2 % (06-17-21 @ 06:26)  Auto Eosinophil %: 0.2 % (06-17-21 @ 06:26)  PT/INR - ( 17 Jun 2021 06:26 )   PT: 27.2 sec;   INR: 2.47 ratio         PTT - ( 17 Jun 2021 06:26 )  PTT:40.7 dms03-51    133<L>  |  97<L>  |  24<H>  ----------------------------<  386<H>  5.0   |  18<L>  |  2.17<H>    Ca    9.5      17 Jun 2021 06:26    TPro  7.0  /  Alb  3.3  /  TBili  1.7<H>  /  DBili  x   /  AST  128<H>  /  ALT  128<H>  /  AlkPhos  160<H>  06-17      Mode: AC/ CMV (Assist Control/ Continuous Mandatory Ventilation)  RR (machine): 15  FiO2: 100  PEEP: 5  ITime: 1.2  MAP: 17  PC: 20  PIP: 40    IMPRESSION/RISK:  Dx=  Differential includes but not limited to conditions listed in order of most possible first:   Consideration include  Plan  aMIOdarone Infusion 1 mG/Min (33.3 mL/Hr) IV Continuous  chlorhexidine 0.12% Liquid 15 milliLiter(s) Oral Mucosa  EPINEPHrine    Infusion 2 MICROgram(s)/kG/Min (750 mL/Hr) IV Continuous  fentaNYL   Infusion. 0.5 MICROgram(s)/kG/Hr (5 mL/Hr) IV Continuous  glucagon  Injectable 5 milliGRAM(s) IV Push  insulin regular  human recombinant 10 Unit(s) IV Push  metoprolol tartrate Injectable 5 milliGRAM(s) IV Push  norepinephrine Infusion 0.05 MICROgram(s)/kG/Min (9.38 mL/Hr) IV Continuous  vasopressin Infusion 0.02 Unit(s)/Min (1.2 mL/Hr) IV Continuous

## 2021-06-17 NOTE — H&P ADULT - NSHPPHYSICALEXAM_GEN_ALL_CORE
VITALS:   T(C): 36.3 (06-17-21 @ 05:26), Max: 36.3 (06-17-21 @ 05:26)  HR: 80 (06-17-21 @ 09:14) (39 - 170)  BP: 98/84 (06-17-21 @ 09:14) (34/13 - 170/87)  RR: 21 (06-17-21 @ 09:14) (15 - 30)  SpO2: 100% (06-17-21 @ 09:14) (96% - 100%)    GENERAL: Sedated; intubated  HEAD:  Normocephalic  EYES: Sclera clear  ENT: Moist mucous membranes  NECK: Supple, No JVD  CHEST/LUNG: Clear to auscultation bilaterally; Intubated  HEART: Regular rate and rhythm; No murmurs, rubs, or gallops  ABDOMEN: BSx4; Soft, nontender, nondistended  EXTREMITIES:  2+ Peripheral Pulses, brisk capillary refill. No clubbing, cyanosis, or edema  NERVOUS SYSTEM:  Sedated  SKIN: No rashes or lesions

## 2021-06-17 NOTE — CONSULT NOTE ADULT - SUBJECTIVE AND OBJECTIVE BOX
RFC: blood per NGT    HPI: 65 M w/ h/o HFrEF (EF 15% 2013), HTN, HLD, CAD s/p SHAMA x3, AICD, on Xarelto initially p/w ?abd pain. Initially thought to be in SVT, treated with lopressor, underwent cardioversion w/ CPR initiated with ADRIANO device s/p ROSC. Now in likely hemorrhagic shock w/ multiple sites of bleeding (pulmonary contusion/hemorrhage, hemopericardium, hepatic laceration, pancreatic injury). GI now consulted for blood via NGT c/f UGI bleed        Allergies:  No Known Allergies      Home Medications:    Hospital Medications:  chlorhexidine 0.12% Liquid 15 milliLiter(s) Oral Mucosa every 12 hours  fentaNYL   Infusion. 0.5 MICROgram(s)/kG/Hr IV Continuous <Continuous>  norepinephrine Infusion 0.05 MICROgram(s)/kG/Min IV Continuous <Continuous>  pantoprazole Infusion 8 mG/Hr IV Continuous <Continuous>  vasopressin Infusion 0.02 Unit(s)/Min IV Continuous <Continuous>      PMHX/PSHX:  Essential Hypertension    CAD (Coronary Artery Disease)    Hyperlipidemia    Myocardial infarct, old    Non-ST Elevation Myocardial Infarction (NSTEMI)    Stented coronary artery        Family history:  No pertinent family history in first degree relatives        Denies family history of colon cancer/polyps, stomach cancer/polyps, pancreatic cancer/masses, liver cancer/disease, ovarian cancer and endometrial cancer.    Social History:     Tob: Denies  EtOH: Denies  Illicit Drugs: Denies    ROS:     General:  No wt loss, fevers, chills, night sweats, fatigue  Eyes:  Good vision, no reported pain  ENT:  No sore throat, pain, runny nose, dysphagia  CV:  No pain, palpitations, hypo/hypertension  Pulm:  No dyspnea, cough, tachypnea, wheezing  GI:  No pain, No nausea, No vomiting, No diarrhea, No constipation, No weight loss, No fever, No pruritis, No rectal bleeding, No tarry stools, No dysphagia,  :  No pain, bleeding, incontinence, nocturia  Muscle:  No pain, weakness  Neuro:  No weakness, tingling, memory problems  Psych:  No fatigue, insomnia, mood problems, depression  Endocrine:  No polyuria, polydipsia, cold/heat intolerance  Heme:  No petechiae, ecchymosis, easy bruisability  Skin:  No rash, tattoos, scars, edema    PHYSICAL EXAM:     GENERAL:  No acute distress  HEENT:  Normocephalic/atraumatic, no scleral icterus  CHEST:  Clear to auscultation bilaterally, no wheezes/rales/ronchi, no accessory muscle use  HEART:  Regular rate and rhythm, no murmurs/rubs/gallops  ABDOMEN:  Soft, non-tender, non-distended, normoactive bowel sounds,  no masses, no hepato-splenomegaly, no signs of chronic liver disease  EXTREMITIES: No cyanosis, clubbing, or edema  SKIN:  No rash/erythema/ecchymoses/petechiae/wounds/abscess/warm/dry  NEURO:  Alert and oriented x 3, no asterixis    Vital Signs:  Vital Signs Last 24 Hrs  T(C): 36.3 (17 Jun 2021 05:26), Max: 36.3 (17 Jun 2021 05:26)  T(F): 97.4 (17 Jun 2021 05:26), Max: 97.4 (17 Jun 2021 05:26)  HR: 80 (17 Jun 2021 09:14) (39 - 170)  BP: 98/84 (17 Jun 2021 09:14) (34/13 - 170/87)  BP(mean): 87 (17 Jun 2021 09:14) (18 - 108)  RR: 21 (17 Jun 2021 09:14) (15 - 30)  SpO2: 100% (17 Jun 2021 09:14) (96% - 100%)  Daily Height in cm: 180.34 (17 Jun 2021 05:26)    Daily     LABS:                        8.4    12.15 )-----------( 99       ( 17 Jun 2021 12:14 )             29.1     Mean Cell Volume: 107.0 fL (06-17-21 @ 12:14)    06-17    148<H>  |  105  |  21  ----------------------------<  383<H>  4.8   |  12<L>  |  1.73<H>    Ca    8.1<L>      17 Jun 2021 10:34  Phos  9.2     06-17  Mg     3.0     06-17    TPro  4.1<L>  /  Alb  2.0<L>  /  TBili  0.9  /  DBili  x   /  AST  203<H>  /  ALT  178<H>  /  AlkPhos  111  06-17    LIVER FUNCTIONS - ( 17 Jun 2021 10:34 )  Alb: 2.0 g/dL / Pro: 4.1 g/dL / ALK PHOS: 111 U/L / ALT: 178 U/L / AST: 203 U/L / GGT: x           PT/INR - ( 17 Jun 2021 06:26 )   PT: 27.2 sec;   INR: 2.47 ratio         PTT - ( 17 Jun 2021 06:26 )  PTT:40.7 sec                            8.4    12.15 )-----------( 99       ( 17 Jun 2021 12:14 )             29.1                         7.8    11.78 )-----------( 93       ( 17 Jun 2021 10:34 )             26.7                         12.3   13.30 )-----------( 231      ( 17 Jun 2021 06:26 )             38.3       Imaging:      < from: CT Abdomen and Pelvis w/wo IV Cont (06.17.21 @ 10:46) >    EXAM:  CT CHEST IC      EXAM:  CT ABDOMEN AND PELVIS WAW IC        PROCEDURE DATE:  Jun 17 2021         INTERPRETATION:  CLINICAL INFORMATION: Acute anemia. Concern for injury during ADRIANO device usage. Intra-aortic balloon pump.    COMPARISON: None.    CONTRAST/COMPLICATIONS:  IV Contrast: Omnipaque 350  90 cc administered   10 cc discarded  Oral Contrast: NONE  Complications: None reported at time of study completion    PROCEDURE:  CT of the Chest, Abdomen and Pelvis was performed.  Noncontrast imaging was performed through the chest abdomen and pelvis.  Imaging was performed through the chest in the arterial phase followed by imaging of the abdomen and pelvis in the portal venous phase.  Additional delayed imaging was performed after deactivation of intra-aortic balloon pump.  Sagittal and coronal reformats were performed.    FINDINGS:  CHEST:  LUNGS AND LARGE AIRWAYS: Endotracheal tube tip is above the errol. No endobronchial lesion. Patchy ground glass opacities within the right lung may represent pulmonary contusion/hemorrhage. Bibasilar and linear subsegmental atelectasis  PLEURA: No pneumothorax. No pleural effusions.  VESSELS: Intra-aortic balloon pump. `Scattered foci of air within the bilateral veins in the supraclavicular region.  HEART: Cardiomegaly. Small amount of high density pericardial effusion, likely hemopericardium. Right chest two lead AICD..  MEDIASTINUM AND CRISTOBAL: No lymphadenopathy.  CHEST WALL AND LOWER NECK: Within normal limits.    ABDOMEN AND PELVIS:  LIVER: Linear segment 4 hepatic laceration measuring up to 5 cm. (10:99). Additional 2.5 cm focus of intrahepatic contrast extravasation, contiguous with the middle hepatic vein (10:102). Intrahepatic portal venous gas. No subcapsular hematoma.  BILE DUCTS: Normal caliber.  GALLBLADDER: Contracted gallbladder with nonspecific gallbladder wall thickening and mucosal enhancement.  SPLEEN: Within normal limits.  PANCREAS: Small 1.8 cm region of parenchymal hypoenhancement within the body of the pancreas, with trace peripancreatic fluid and fat stranding.  ADRENALS: Nodular thickening of bilateral adrenal glands.  KIDNEYS/URETERS: Within normal limits.    BLADDER: Within normal limits.  REPRODUCTIVE ORGANS: Prostate within normal limits.    BOWEL: No bowel obstruction or wall thickening. Normal enhancement. No pneumatosis.. Appendix is not visualized. No evidence of inflammation in the pericecal region.  PERITONEUM: Trace ascites. No hemoperitoneum or pneumoperitoneum.  VESSELS: Right femoral approach intra-aortic balloon pump. Left femoral central venous catheter tip is in the IVC. Trace air within the IVC and left renal vein.  RETROPERITONEUM/LYMPH NODES: No lymphadenopathy.  ABDOMINAL WALL: Small fat-containing left inguinal hernia.  BONES: Sternal fracture. No rib fractures.    IMPRESSION:  Right lung pulmonary contusion/hemorrhage. No hemothorax or pneumothorax.    Small hemopericardium.    Segment 4 hepatic laceration and additional focus of intrahepatic contrast extravasation.No subcapsular hematoma. No hemoperitoneum or pneumoperitoneum.    Pancreatic body parenchymal hypoenhancement with peripancreatic fat stranding, concerning for pancreatic injury.    Sternal fracture.    Findings were discussed with Dr Lacey by Dr Posadas with read back confirmation.    < end of copied text >       RFC: blood per NGT    HPI: 65 M w/ h/o HFrEF (EF 15% 2013), HTN, HLD, CAD s/p SHAMA x3, AICD, on Xarelto initially p/w ?abd pain. Initially thought to be in SVT, treated with lopressor, underwent cardioversion w/ CPR initiated with ADRIANO device s/p ROSC. Now in likely hemorrhagic shock w/ multiple sites of bleeding (pulmonary contusion/hemorrhage, hemopericardium, hepatic laceration, pancreatic injury). GI now consulted for blood via NGT c/f UGI bleed    Patient intubated in CCU, unable to provide history.    Allergies:  No Known Allergies      Home Medications:    Hospital Medications:  chlorhexidine 0.12% Liquid 15 milliLiter(s) Oral Mucosa every 12 hours  fentaNYL   Infusion. 0.5 MICROgram(s)/kG/Hr IV Continuous <Continuous>  norepinephrine Infusion 0.05 MICROgram(s)/kG/Min IV Continuous <Continuous>  pantoprazole Infusion 8 mG/Hr IV Continuous <Continuous>  vasopressin Infusion 0.02 Unit(s)/Min IV Continuous <Continuous>      PMHX/PSHX:  Essential Hypertension    CAD (Coronary Artery Disease)    Hyperlipidemia    Myocardial infarct, old    Non-ST Elevation Myocardial Infarction (NSTEMI)    Stented coronary artery      FH: Unable to obtain    Social History: Unable to obtain    ROS: Unable to obtain      PHYSICAL EXAM:     GENERAL:  Intubated, sedated, OGT in place with red blood  HEENT:  Normocephalic/atraumatic, no scleral icterus  CHEST: Intubated on mechanical ventilation   HEART: Normal rate, IABP in place, on pressors  ABDOMEN:  soft, but moderately distended, unable to assess TTP  EXTREMITIES: No cyanosis, clubbing, or edema  SKIN:  No rashes on exposed skin  NEURO:  Alert and oriented x 0, sedated    Vital Signs:  Vital Signs Last 24 Hrs  T(C): 36.3 (17 Jun 2021 05:26), Max: 36.3 (17 Jun 2021 05:26)  T(F): 97.4 (17 Jun 2021 05:26), Max: 97.4 (17 Jun 2021 05:26)  HR: 80 (17 Jun 2021 09:14) (39 - 170)  BP: 98/84 (17 Jun 2021 09:14) (34/13 - 170/87)  BP(mean): 87 (17 Jun 2021 09:14) (18 - 108)  RR: 21 (17 Jun 2021 09:14) (15 - 30)  SpO2: 100% (17 Jun 2021 09:14) (96% - 100%)  Daily Height in cm: 180.34 (17 Jun 2021 05:26)    Daily     LABS:                        8.4    12.15 )-----------( 99       ( 17 Jun 2021 12:14 )             29.1     Mean Cell Volume: 107.0 fL (06-17-21 @ 12:14)    06-17    148<H>  |  105  |  21  ----------------------------<  383<H>  4.8   |  12<L>  |  1.73<H>    Ca    8.1<L>      17 Jun 2021 10:34  Phos  9.2     06-17  Mg     3.0     06-17    TPro  4.1<L>  /  Alb  2.0<L>  /  TBili  0.9  /  DBili  x   /  AST  203<H>  /  ALT  178<H>  /  AlkPhos  111  06-17    LIVER FUNCTIONS - ( 17 Jun 2021 10:34 )  Alb: 2.0 g/dL / Pro: 4.1 g/dL / ALK PHOS: 111 U/L / ALT: 178 U/L / AST: 203 U/L / GGT: x           PT/INR - ( 17 Jun 2021 06:26 )   PT: 27.2 sec;   INR: 2.47 ratio         PTT - ( 17 Jun 2021 06:26 )  PTT:40.7 sec                            8.4    12.15 )-----------( 99       ( 17 Jun 2021 12:14 )             29.1                         7.8    11.78 )-----------( 93       ( 17 Jun 2021 10:34 )             26.7                         12.3   13.30 )-----------( 231      ( 17 Jun 2021 06:26 )             38.3       Imaging:      < from: CT Abdomen and Pelvis w/wo IV Cont (06.17.21 @ 10:46) >    EXAM:  CT CHEST IC      EXAM:  CT ABDOMEN AND PELVIS WAW IC        PROCEDURE DATE:  Jun 17 2021         INTERPRETATION:  CLINICAL INFORMATION: Acute anemia. Concern for injury during ADRIANO device usage. Intra-aortic balloon pump.    COMPARISON: None.    CONTRAST/COMPLICATIONS:  IV Contrast: Omnipaque 350  90 cc administered   10 cc discarded  Oral Contrast: NONE  Complications: None reported at time of study completion    PROCEDURE:  CT of the Chest, Abdomen and Pelvis was performed.  Noncontrast imaging was performed through the chest abdomen and pelvis.  Imaging was performed through the chest in the arterial phase followed by imaging of the abdomen and pelvis in the portal venous phase.  Additional delayed imaging was performed after deactivation of intra-aortic balloon pump.  Sagittal and coronal reformats were performed.    FINDINGS:  CHEST:  LUNGS AND LARGE AIRWAYS: Endotracheal tube tip is above the errol. No endobronchial lesion. Patchy ground glass opacities within the right lung may represent pulmonary contusion/hemorrhage. Bibasilar and linear subsegmental atelectasis  PLEURA: No pneumothorax. No pleural effusions.  VESSELS: Intra-aortic balloon pump. `Scattered foci of air within the bilateral veins in the supraclavicular region.  HEART: Cardiomegaly. Small amount of high density pericardial effusion, likely hemopericardium. Right chest two lead AICD..  MEDIASTINUM AND CRISTOBAL: No lymphadenopathy.  CHEST WALL AND LOWER NECK: Within normal limits.    ABDOMEN AND PELVIS:  LIVER: Linear segment 4 hepatic laceration measuring up to 5 cm. (10:99). Additional 2.5 cm focus of intrahepatic contrast extravasation, contiguous with the middle hepatic vein (10:102). Intrahepatic portal venous gas. No subcapsular hematoma.  BILE DUCTS: Normal caliber.  GALLBLADDER: Contracted gallbladder with nonspecific gallbladder wall thickening and mucosal enhancement.  SPLEEN: Within normal limits.  PANCREAS: Small 1.8 cm region of parenchymal hypoenhancement within the body of the pancreas, with trace peripancreatic fluid and fat stranding.  ADRENALS: Nodular thickening of bilateral adrenal glands.  KIDNEYS/URETERS: Within normal limits.    BLADDER: Within normal limits.  REPRODUCTIVE ORGANS: Prostate within normal limits.    BOWEL: No bowel obstruction or wall thickening. Normal enhancement. No pneumatosis.. Appendix is not visualized. No evidence of inflammation in the pericecal region.  PERITONEUM: Trace ascites. No hemoperitoneum or pneumoperitoneum.  VESSELS: Right femoral approach intra-aortic balloon pump. Left femoral central venous catheter tip is in the IVC. Trace air within the IVC and left renal vein.  RETROPERITONEUM/LYMPH NODES: No lymphadenopathy.  ABDOMINAL WALL: Small fat-containing left inguinal hernia.  BONES: Sternal fracture. No rib fractures.    IMPRESSION:  Right lung pulmonary contusion/hemorrhage. No hemothorax or pneumothorax.    Small hemopericardium.    Segment 4 hepatic laceration and additional focus of intrahepatic contrast extravasation.No subcapsular hematoma. No hemoperitoneum or pneumoperitoneum.    Pancreatic body parenchymal hypoenhancement with peripancreatic fat stranding, concerning for pancreatic injury.    Sternal fracture.    Findings were discussed with Dr Lacey by Dr Posadas with read back confirmation.    < end of copied text >

## 2021-06-17 NOTE — CONSULT NOTE ADULT - ASSESSMENT
66yo M w/ h/o HFrEF (EF 15% 2013), HTN, HLD, CAD s/p SHAMA x3, AICD, on Xarelto (since 2/2021) p/w diffuse abdominal discomfort since the morning prior to admission. Hospital course c/b VT and cardiac arrest x2 requiring intubation and pressor support. Pt admitted to CCU for further management.      # VT /Cardiac Arrest/ shock  -events noted  -primary event leading to VT unclear, abdominal pain, ischemic vs VT in setting of advanced LV dysfunction and known systolic CHF  -refractory VT and subsequent cardiac arrest failed cardioversion by device  -prolonged/recurrent CPR with ADRIANO device  -s/p IABP for hemodynamic support  -now intubated, on vasopressor support  -acidotic, lactate 21    # Anemia  -CT noted for right pulm hemothroax, liver laceration and possible pancreatic injury  -likely due to CPR/ADRIANO  -transfuse as needed  -appreciate GI/sx eval, consider IR embolization if bleeding persists    Guarded Prognosis  Discussed with CCU attending

## 2021-06-17 NOTE — CONSULT NOTE ADULT - ASSESSMENT
Impression:   Guarded prognosis   #UGI bleed: via NGT   #Pancreatic injury: in setting of chest compressions via ADRIANO device       Recommendations:   -PPI gtt  -GOC with family  -Transfuse Hb < 7  -Active T&S  -No plans for endoscopic intervention   -Full note to follow     Thank you for involving us in the care of this patient, please reach out if any further questions.     Ilia Moore MD  Gastroenterology Fellow, PGY4    Available on [employer] Teams  [phone] ([facility])  [phone] ([facility])  Please contact on call fellow weekdays after 5pm-7am and weekends: [phone]   Impression:   Guarded prognosis, currently in CCU intubated, on pressors with IABP  #UGI bleed: via NGT  #Pancreatic injury: in setting of chest compressions via ADRIANO device     Recommendations:   -PPI gtt  -GOC with family  -Transfuse Hb < 7  -Active T&S  -No plans for endoscopic intervention as risks outweigh benefits at this time    Thank you for involving us in the care of this patient, please reach out if any further questions.     Ilia Moore MD  Gastroenterology Fellow, PGY4    Available on [employer] Teams  [phone] ([facility])  [phone] ([facility])  Please contact on call fellow weekdays after 5pm-7am and weekends: [phone]

## 2021-06-17 NOTE — CONSULT NOTE ADULT - SUBJECTIVE AND OBJECTIVE BOX
CHIEF COMPLAINT:  Called to evaluate patient presented with VT    HISTORY OF PRESENT ILLNESS:  64yo M w/ h/o HFrEF (EF 15% 2013), HTN, HLD, CAD s/p SHAMA x3, AICD, on Xarelto (since 2/2021) p/w diffuse abdominal discomfort since the morning prior to admission. Per chart review,  he had complained of abdominal pain the weekend prior to admission after "drinking juice" associated with poor PO intake. Patient was found to be in VT in the ED.  Patient was given 5mg metoprolol at that time. Per ED note: Patient's arm started twitching he made gasping respirations and then suddenly became hypotensive. Patient cardioverted @100j cardioversion attempted with recovery of paced rhythm but with no pulse with sudden loss of respirations/Mental status. CPR was started and patient was intubated. He also received 3mg epi x3, amp bicarb and atropine with ROSC obtained after approx 15min). Patient still was started on pressors due to hypotension gtt and 300mg Amiodarone for refractory VT/VF. Of note, patient's ICD fired twice in the ED without conversion despite Amiodarone. Following ROSC in the ED, patient was taken by CCU immediately to the cath lab for balloon pump placement given cardiogenic shock. At the time of balloon pump placement, blood was noted to be very "light/pale". Hgb at that time was found to be significantly reduced relative to admission labs (12 --> 7), so pt was taken for urgent CT chest/abd/pelvis w/ IV contrast to eval for possible bleeding. CT significant for: Segment 4 hepatic laceration and additional focus of intrahepatic contrast extravasation. No subcapsular hematoma. No hemoperitoneum or pneumoperitoneum. Right lung pulmonary contusion/hemorrhage. No hemothorax or pneumothorax. Small hemopericardium. Pancreatic body parenchymal hypoenhancement with peripancreatic fat stranding, concerning for pancreatic injury. Surgery, GI, and IR consulted. EP was consulted for VT. ICD interrogation revealed episodes of VF with ICD shocks x3 (see ICD interrogation note). Currently patient is intubated, on pressors.       PAST MEDICAL & SURGICAL HISTORY:  Essential Hypertension    CAD (Coronary Artery Disease)    Hyperlipidemia    Myocardial infarct, old  6/13/11 STEMI, 08/2010 STEMI    Stented coronary artery  1 Stent 06/13/11, 2 stents 8/17/2010      PREVIOUS DIAGNOSTIC TESTING:    Echocardiogram:  from: Transthoracic Echocardiogram (06.17.21 @ 11:20)   DIMENSIONS:  Dimensions:     Normal Values:  LA:     4.2 cm    2.0 - 4.0 cm  Ao:     3.0 cm    2.0 - 3.8 cm  SEPTUM: 1.0 cm    0.6 - 1.2 cm  PWT:    1.0 cm    0.6 - 1.1 cm  LVIDd:  7.2 cm    3.0 - 5.6 cm  LVIDs:  6.8 cm    1.8 - 4.0 cm  Derived Variables:  LVMI: 159 g/m2  RWT: 0.27  Fractional short: 6 %  Ejection Fraction (Teicholtz): 12 %  ------------------------------------------------------------------------  OBSERVATIONS:  Mitral Valve: Mitral annular calcification, otherwise  normal mitral valve. Moderate mitral regurgitation.  Aortic Root: Normal aortic root.  Aortic Valve: Calcified trileaflet aortic valve with normal  opening.  Left Atrium: Normal left atrium.  Left Ventricle: Severe global left ventricular systolic  dysfunction. Severe left ventricular enlargement. Moderate  diastolic dysfunction (Stage II).  Right Heart: Normal right atrium. Normal right ventricular  size and function. Normal tricuspid valve.  Moderate  tricuspid regurgitation. Normal pulmonic valve.  Mild  pulmonic regurgitation.  Pericardium/PleuraSmall pericardial effusion superior to  the right atrium.  ------------------------------------------------------------------------  CONCLUSIONS:  1. Mitral annular calcification, otherwise normal mitral  valve. Moderate mitral regurgitation.  2. Severe left ventricular enlargement.  3. Severe global left ventricular systolic dysfunction.  4. Moderatediastolic dysfunction (Stage II).  5. Normal right ventricular size and function.  6. Small pericardial effusion superior to the right atrium.  *** Compared with echocardiogram of 7/8/2013, more mitral  regurgitation was noted on the current study.    	    MEDICATIONS:  norepinephrine Infusion 0.05 MICROgram(s)/kG/Min IV Continuous <Continuous>        fentaNYL   Infusion. 0.5 MICROgram(s)/kG/Hr IV Continuous <Continuous>    pantoprazole Infusion 8 mG/Hr IV Continuous <Continuous>    vasopressin Infusion 0.02 Unit(s)/Min IV Continuous <Continuous>    chlorhexidine 0.12% Liquid 15 milliLiter(s) Oral Mucosa every 12 hours  sodium bicarbonate  Infusion 0.1 mEq/kG/Hr IV Continuous <Continuous>      FAMILY HISTORY:  No pertinent family history in first degree relatives        SOCIAL HISTORY:      [ ] Smoker  [ ] Alcohol  [ ] Drugs    Allergies    No Known Allergies    Intolerances    	    REVIEW OF SYSTEMS:  CONSTITUTIONAL: No fever, weight loss, or fatigue  EYES: No eye pain, visual disturbances, or discharge  ENMT:  No difficulty hearing, tinnitus, vertigo; No sinus or throat pain  NECK: No pain or stiffness  RESPIRATORY: No cough, wheezing, chills or hemoptysis; No Shortness of Breath  CARDIOVASCULAR: No chest pain, palpitations, passing out, dizziness, or leg swelling  GASTROINTESTINAL: No abdominal or epigastric pain. No nausea, vomiting, or hematemesis; No diarrhea or constipation. No melena or hematochezia.  GENITOURINARY: No dysuria, frequency, hematuria, or incontinence  NEUROLOGICAL: No headaches, memory loss, loss of strength, numbness, or tremors  SKIN: No itching, burning, rashes, or lesions   LYMPH Nodes: No enlarged glands  ENDOCRINE: No heat or cold intolerance; No hair loss  MUSCULOSKELETAL: No joint pain or swelling; No muscle, back, or extremity pain  PSYCHIATRIC: No depression, anxiety, mood swings, or difficulty sleeping  HEME/LYMPH: No easy bruising, or bleeding gums  ALLERY AND IMMUNOLOGIC: No hives or eczema	    [ ] All others negative	  [ ] Unable to obtain    PHYSICAL EXAM:  T(C): 33.9 (06-17-21 @ 11:15), Max: 36.3 (06-17-21 @ 05:26)  HR: 65 (06-17-21 @ 13:00) (39 - 170)  BP: 98/84 (06-17-21 @ 09:14) (34/13 - 170/87)  RR: 24 (06-17-21 @ 13:00) (15 - 30)  SpO2: 100% (06-17-21 @ 13:00) (96% - 100%)  Wt(kg): --  I&O's Summary      Appearance: Normal	  HEENT:   Normal oral mucosa, PERRL, EOMI	  Lymphatic: No lymphadenopathy  Cardiovascular: Normal S1 S2, No JVD, No murmurs, No edema  Respiratory: Lungs clear to auscultation	  Psychiatry: A & O x 3, Mood & affect appropriate  Gastrointestinal:  Soft, Non-tender, + BS	  Skin: No rashes, No ecchymoses, No cyanosis	  Neurologic: Non-focal  Extremities: Normal range of motion, No clubbing, cyanosis or edema  Vascular: Peripheral pulses palpable 2+ bilaterally    TELEMETRY: 	    ECG:  	  RADIOLOGY:  OTHER: 	  	  LABS:	 	    CARDIAC MARKERS:                                  8.4    12.15 )-----------( 99       ( 17 Jun 2021 12:14 )             29.1     06-17    145  |  103  |  23  ----------------------------<  324<H>  6.1<H>   |  <7<LL>  |  2.66<H>    Ca    8.0<L>      17 Jun 2021 12:14  Phos  12.0     06-17  Mg     3.3     06-17    TPro  4.6<L>  /  Alb  2.0<L>  /  TBili  1.1  /  DBili  x   /  AST  471<H>  /  ALT  394<H>  /  AlkPhos  131<H>  06-17    proBNP:   Lipid Profile:   HgA1c:   TSH:          CHIEF COMPLAINT:  Called to evaluate patient presented with VT    HISTORY OF PRESENT ILLNESS:  66yo M w/ h/o HFrEF (EF 15% 2013), HTN, HLD, CAD s/p SHAMA x3, AICD, on Xarelto (since 2/2021) p/w diffuse abdominal discomfort since the morning prior to admission. Per chart review,  he had complained of abdominal pain the weekend prior to admission after "drinking juice" associated with poor PO intake. Patient was found to be in VT in the ED.  Patient was given 5mg metoprolol at that time. Per ED note: Patient's arm started twitching he made gasping respirations and then suddenly became hypotensive. Patient cardioverted @100j cardioversion attempted with recovery of paced rhythm but with no pulse with sudden loss of respirations/Mental status. CPR was started and patient was intubated. He also received 3mg epi x3, amp bicarb and atropine with ROSC obtained after approx 15min). Patient still was started on pressors due to hypotension gtt and 300mg Amiodarone for refractory VT/VF. Of note, patient's ICD fired twice in the ED without conversion despite Amiodarone. Following ROSC in the ED, patient was taken by CCU immediately to the cath lab for balloon pump placement given cardiogenic shock. At the time of balloon pump placement, blood was noted to be very "light/pale". Hgb at that time was found to be significantly reduced relative to admission labs (12 --> 7), so pt was taken for urgent CT chest/abd/pelvis w/ IV contrast to eval for possible bleeding. CT significant for: Segment 4 hepatic laceration and additional focus of intrahepatic contrast extravasation. No subcapsular hematoma. No hemoperitoneum or pneumoperitoneum. Right lung pulmonary contusion/hemorrhage. No hemothorax or pneumothorax. Small hemopericardium. Pancreatic body parenchymal hypoenhancement with peripancreatic fat stranding, concerning for pancreatic injury. Surgery, GI, and IR consulted. EP was consulted for VT. ICD interrogation revealed episodes of VF with ICD shocks x3 (see ICD interrogation note). Currently patient is intubated, on pressors.       PAST MEDICAL & SURGICAL HISTORY:  Essential Hypertension    CAD (Coronary Artery Disease)    Hyperlipidemia    Myocardial infarct, old  6/13/11 STEMI, 08/2010 STEMI    Stented coronary artery  1 Stent 06/13/11, 2 stents 8/17/2010      PREVIOUS DIAGNOSTIC TESTING:    Echocardiogram:  from: Transthoracic Echocardiogram (06.17.21 @ 11:20)   DIMENSIONS:  Dimensions:     Normal Values:  LA:     4.2 cm    2.0 - 4.0 cm  Ao:     3.0 cm    2.0 - 3.8 cm  SEPTUM: 1.0 cm    0.6 - 1.2 cm  PWT:    1.0 cm    0.6 - 1.1 cm  LVIDd:  7.2 cm    3.0 - 5.6 cm  LVIDs:  6.8 cm    1.8 - 4.0 cm  Derived Variables:  LVMI: 159 g/m2  RWT: 0.27  Fractional short: 6 %  Ejection Fraction (Teicholtz): 12 %  ------------------------------------------------------------------------  OBSERVATIONS:  Mitral Valve: Mitral annular calcification, otherwise  normal mitral valve. Moderate mitral regurgitation.  Aortic Root: Normal aortic root.  Aortic Valve: Calcified trileaflet aortic valve with normal  opening.  Left Atrium: Normal left atrium.  Left Ventricle: Severe global left ventricular systolic  dysfunction. Severe left ventricular enlargement. Moderate  diastolic dysfunction (Stage II).  Right Heart: Normal right atrium. Normal right ventricular  size and function. Normal tricuspid valve.  Moderate  tricuspid regurgitation. Normal pulmonic valve.  Mild  pulmonic regurgitation.  Pericardium/PleuraSmall pericardial effusion superior to  the right atrium.  ------------------------------------------------------------------------  CONCLUSIONS:  1. Mitral annular calcification, otherwise normal mitral  valve. Moderate mitral regurgitation.  2. Severe left ventricular enlargement.  3. Severe global left ventricular systolic dysfunction.  4. Moderatediastolic dysfunction (Stage II).  5. Normal right ventricular size and function.  6. Small pericardial effusion superior to the right atrium.  *** Compared with echocardiogram of 7/8/2013, more mitral  regurgitation was noted on the current study.    	    MEDICATIONS:  norepinephrine Infusion 0.05 MICROgram(s)/kG/Min IV Continuous <Continuous>        fentaNYL   Infusion. 0.5 MICROgram(s)/kG/Hr IV Continuous <Continuous>    pantoprazole Infusion 8 mG/Hr IV Continuous <Continuous>    vasopressin Infusion 0.02 Unit(s)/Min IV Continuous <Continuous>    chlorhexidine 0.12% Liquid 15 milliLiter(s) Oral Mucosa every 12 hours  sodium bicarbonate  Infusion 0.1 mEq/kG/Hr IV Continuous <Continuous>      FAMILY HISTORY:  No pertinent family history in first degree relatives        SOCIAL HISTORY:      [ ] Smoker  [ ] Alcohol  [ ] Drugs    Allergies    No Known Allergies    Intolerances    	    REVIEW OF SYSTEMS:  CONSTITUTIONAL: No fever, weight loss, or fatigue  EYES: No eye pain, visual disturbances, or discharge  ENMT:  No difficulty hearing, tinnitus, vertigo; No sinus or throat pain  NECK: No pain or stiffness  RESPIRATORY: No cough, wheezing, chills or hemoptysis; No Shortness of Breath  CARDIOVASCULAR: No chest pain, palpitations, passing out, dizziness, or leg swelling  GASTROINTESTINAL: + abdominal pain.   GENITOURINARY: No dysuria, frequency, hematuria, or incontinence  NEUROLOGICAL: No headaches, memory loss, loss of strength, numbness, or tremors  SKIN: No itching, burning, rashes, or lesions   LYMPH Nodes: No enlarged glands  ENDOCRINE: No heat or cold intolerance; No hair loss  MUSCULOSKELETAL: No joint pain or swelling; No muscle, back, or extremity pain  PSYCHIATRIC: No depression, anxiety, mood swings, or difficulty sleeping  HEME/LYMPH: No easy bruising, or bleeding gums  ALLERY AND IMMUNOLOGIC: No hives or eczema	    [X] All others negative	  [ ] Unable to obtain    PHYSICAL EXAM:  T(C): 33.9 (06-17-21 @ 11:15), Max: 36.3 (06-17-21 @ 05:26)  HR: 65 (06-17-21 @ 13:00) (39 - 170)  BP: 98/84 (06-17-21 @ 09:14) (34/13 - 170/87)  RR: 24 (06-17-21 @ 13:00) (15 - 30)  SpO2: 100% (06-17-21 @ 13:00) (96% - 100%)  Wt(kg): --  I&O's Summary      Appearance: Intubated    Cardiovascular: Sinus rhythm with intermittent AV pacing  Respiratory: LIntubated    TELEMETRY: Sinus rhythm with A and V pacing	    ECG:  	ventricular tachycardia with HR   RADIOLOGY:  OTHER: 	  	  LABS:	 	    CARDIAC MARKERS:                        8.4    12.15 )-----------( 99       ( 17 Jun 2021 12:14 )             29.1     06-17    145  |  103  |  23  ----------------------------<  324<H>  6.1<H>   |  <7<LL>  |  2.66<H>    Ca    8.0<L>      17 Jun 2021 12:14  Phos  12.0     06-17  Mg     3.3     06-17    TPro  4.6<L>  /  Alb  2.0<L>  /  TBili  1.1  /  DBili  x   /  AST  471<H>  /  ALT  394<H>  /  AlkPhos  131<H>  06-17      TSH: pending

## 2021-06-17 NOTE — ED PROVIDER NOTE - CPE EDP HEME LYMPH NORM
normal... Skin Substitute Paste Text: The defect edges were debeveled with a #15 scalpel blade.  Given the location of the defect, shape of the defect and the proximity to free margins a skin substitute micronized graft was deemed most appropriate.  The entire vial contents were admixed with 0.5ccs of sterile saline, formed into a paste and then evenly spread over the entire wound bed.

## 2021-06-17 NOTE — ED ADULT NURSE NOTE - OBJECTIVE STATEMENT
Sona RN note- 64 y/o male arrives to ED for abdominal pain. PMHx HFrEF (EF 15% 2013), HTN, HLD, CAD s/p SHAMA x3, AICD, on xarelto. Patient placed on cardiac monitor showing SVT. Patient medicated as per order. Patient cardioverted at bedside. Patient found to be pulseless - CPR initiated and patient brought directly to trauma room A. See code flow sheet for further documentation. Patient has 20g IV to right arm and 18g IV to left arm.

## 2021-06-17 NOTE — ED PROVIDER NOTE - OBJECTIVE STATEMENT
64yo M PMHx HFrEF, HTN, HLD, CAD s/p SHAMA x3, AICD, on xarelto (his wife mentioned a clot in the heart, but wasn't sure if he had afib) who presents with diffuse abdominal discomfort (he could not characterize the pain) since yesterday starting at 11am. The episodes last 30min and the discomfort is worse while laying down. No N/V or diaphoresis. No CP. No pain radiating to the neck, jaw, arm, or back. No SOB or ESTRADA.     See progress note for ED events.    Cards: Sabino 66yo M PMHx HFrEF (EF 15% 2013), HTN, HLD, CAD s/p SHAMA x3, AICD, on xarelto (he and wife were unsure why) who presents with diffuse abdominal discomfort (he could not characterize the pain) since yesterday starting at 11am. The episodes last 30min and the discomfort is worse while laying down. No N/V or diaphoresis. No CP. No pain radiating to the neck, jaw, arm, or back. No SOB or ESTRADA.     See progress note for ED events.    Cards: Sabino

## 2021-06-17 NOTE — H&P ADULT - ASSESSMENT
66yo M w/ h/o HFrEF (EF 15% 2013), HTN, HLD, CAD s/p SHAMA x3, AICD, on Xarelto (since 2/2021) p/w diffuse abdominal discomfort since the morning prior to admission. Hospital course c/b VT and cardiac arrest x2 requiring intubation and pressor support. Pt admitted to CCU for further management.    NEURO  - Sedated w/ fentanyl; will wean as tolerated when/if medically optimized  - Will likely require brain imaging; pt currently not stable enough for transport to imaging    PULM  - Intubated for airway protection  - Monitor ABGs    CARDS  #Cardiogenic Shock  #HFrEF (EF 15%)  #CAD  - S/p cardiac arrest x2 (6/17) in the ED; requiring ADRIANO  - Balloon pump in place (placed 6/17)  - Dr. Gallo aware of pt admission to CCU    GI  #Liver Laceration  - NPO while intubated  - OGT in place  - GI consulted; appreciate recs  - IR consulted; appreciate recs  - Surgery consulted; appreciate recs    RENAL  #JESSICA  - Cr elevated on admission; likely pre-renal 2/2 bleeding  - Monitor CMP  - Avoid nephrotoxic medications/drugs  - Will transfuse as below    ENDO  - No active issues; will continue to monitor  - Monitor POCT fingersticks q6h    ID  - No active issues; will continue to monitor    HEME  #Anemia - Likely 2/2 acute blood loss  - Will transfuse 2u PRBC, 2u plts, 1u FFP  - F/u post transfusion CBC  - Monitor CBC q4h     66yo M w/ h/o HFrEF (EF 15% 2013), HTN, HLD, CAD s/p SHAMA x3, AICD, on Xarelto (since 2/2021) p/w diffuse abdominal discomfort since the morning prior to admission. Hospital course c/b VT and cardiac arrest x2 requiring intubation and pressor support. Pt admitted to CCU for further management.    NEURO  - Sedated w/ fentanyl; will wean as tolerated when/if medically optimized  - Will likely require brain imaging; pt currently not stable enough for transport to imaging    PULM  - Intubated for airway protection  - Monitor ABGs    CARDS  #Cardiogenic Shock  #HFrEF (EF 15%)  #CAD  - S/p cardiac arrest x2 (6/17) in the ED; requiring ADRIANO  - Balloon pump in place (placed 6/17)  - Dr. Gallo aware of pt admission to CCU    GI  #Liver Laceration  #Bloody output from OGT  - NPO while intubated  - OGT in place  - C/w protonix gtt  - GI consulted; appreciate recs  - IR consulted; appreciate recs  - Surgery consulted; appreciate recs    RENAL  #JESSICA  #Lactic Acidosis  - Cr elevated on admission; likely pre-renal 2/2 bleeding  - Monitor CMP  - Avoid nephrotoxic medications/drugs  - Will transfuse as below  - Pt w/ significant acidosis on ABG (pH = 6.29); lactate = 21  - Start bicarb gtt    ENDO  - No active issues; will continue to monitor  - Monitor POCT fingersticks q6h    ID  - No active issues; will continue to monitor    HEME  #Anemia - Likely 2/2 acute blood loss  - Will transfuse 2u PRBC, 2u plts, 1u FFP  - F/u post transfusion CBC  - Monitor CBC q4h  - Maintain active T/S    ETHICS  FULL CODE - will continue GOC w/ family

## 2021-06-17 NOTE — CONSULT NOTE ADULT - ASSESSMENT
66yo M w/ h/o HFrEF (EF 15% 2013), HTN, HLD, CAD s/p SHAMA x3, AICD, on Xarelto (since 2/2021) p/w diffuse abdominal discomfort since the morning prior to admission. Patient s/p CPR with ADRIANO device, now found to have liver laceration with possible active extravasation from middle hepatic vein, pancreatic injury, sternal fracture, and pulmonary contusion. Currently in cardiogenic shock, and acute anemia 2/2 liver laceration.    - Lactate elevated to 21 multifactorial due to cardiogenic shock and recent cardiac arrest  - Trend CBC q4h, transfuse PRN. Currently receiving first unit PRBC. Recommend 1:1:1 transfusion  - Maintain adequate access, currently with TLC and 20g PIV x2, recommend introducer if ongoing transfusion requirements  - If increasingly unstable, or with ongoing transfusion requirements, recommend IR evaluation for possible embolization    Discussed with Dr. Zain Buck. Discussed with CCU team.    Ngozi Clark, PGY2  B Team Surgery [id number]   64yo M w/ h/o HFrEF (EF 15% 2013), HTN, HLD, CAD s/p SHAMA x3, AICD, on Xarelto (since 2/2021) p/w diffuse abdominal discomfort since the morning prior to admission. Patient s/p CPR with ADRIANO device, now found to have liver laceration with possible active extravasation from middle hepatic vein, pancreatic injury, sternal fracture, and pulmonary contusion. Currently in cardiogenic shock, and acute anemia 2/2 liver laceration.    - Lactate elevated to 21 multifactorial due to cardiogenic shock and recent cardiac arrest  - Trend CBC q4h, transfuse PRN. Currently receiving first unit PRBC. Recommend 1:1:1 transfusion  - Maintain adequate access, currently with TLC and 20g PIV x2, recommend introducer if ongoing transfusion requirements  - If increasingly unstable, or with ongoing transfusion requirements, recommend IR evaluation for possible embolization  - Recommend protonix for blood tinged NGT output in setting of shock and anticoagulation    Discussed with Dr. Zain Buck. Discussed with CCU team.    Ngozi Clark, PGY2  B Team Surgery [id number]

## 2021-06-17 NOTE — CONSULT NOTE ADULT - SUBJECTIVE AND OBJECTIVE BOX
GENERAL SURGERY CONSULT NOTE    Consulting surgical team: B Team Surgery [id number]  Consulting attending: Dr. Zain uBck    HPI:  64yo M w/ h/o HFrEF (EF 15% 2013), HTN, HLD, CAD s/p SHAMA x3, AICD, on Xarelto (since 2/2021) p/w diffuse abdominal discomfort since the morning prior to admission. At time of encounter pt intubated and sedated. Per chart review pt w/ h/o C in 2013 which revealed severe LV systolic dysfunction and 100% stenosis of his RPDA. Pt underwent stenting and was discharged on a Life Vest. Following discharge the patient was shocked by his Life Vest, so he was transitioned to an AICD ([employer]). Per family at bedside (daughters and girlfriend) he had complained of abdominal pain the weekend prior to admission after "drinking juice" associated with poor PO intake.     IN THE ED: During transport, pt reportedly noted to be in SVT to 174 on tele; personal review of EKG at that time w/ VT. Pt given 5mg metoprolol at that time. Per ED note: Patient's arm started twitching he made gasping respirations and then suddenly became hypotensive. Patient cardioverted @100j cardioversion attempted with recovery of paced rhythm but with no pulse with sudden loss of respirations/Mental status CPR was started Patient was intubated and patient received 3mg epi x3, amp bicarb and atropine given with ROSC obtained after approx 15min). Patient still hypotensive so started on norepi gtt w/ fent gtt for sedation and 300mg amio for refractory VT/VF Of note patient AICD fired twice without conversion despite amio and with abd pain n,v diuretic hx 2g of mag given.  Patient continues to have episodes of arrest abated by EPI and increasing drip. MICU and CCU consulted given massive pre cpr troponin spill. patient accepted to CCU. Central line placed for additional access (of note Left IJ thrombus seen on pre-CVL US) and additional pressors (norepi/melanie).(17 Jun 2021 11:45)    Patient s/p CPR with ADRIANO device. CT post-CPR demonstrated liver laceration with possible active extravasation from middle hepatic vein, pancreatic injury, sternal fracture, and pulmonary contusion. Hgb drop from 12 to 7.8.    PAST MEDICAL HISTORY:  Essential Hypertension    CAD (Coronary Artery Disease)    Hyperlipidemia    Myocardial infarct, old        PAST SURGICAL HISTORY:  Non-ST Elevation Myocardial Infarction (NSTEMI)    Stented coronary artery        MEDICATIONS:  chlorhexidine 0.12% Liquid 15 milliLiter(s) Oral Mucosa every 12 hours  fentaNYL   Infusion. 0.5 MICROgram(s)/kG/Hr IV Continuous <Continuous>  norepinephrine Infusion 0.05 MICROgram(s)/kG/Min IV Continuous <Continuous>  pantoprazole Infusion 8 mG/Hr IV Continuous <Continuous>  vasopressin Infusion 0.02 Unit(s)/Min IV Continuous <Continuous>      ALLERGIES:  No Known Allergies      VITALS & I/Os:  Vital Signs Last 24 Hrs  T(C): 36.3 (17 Jun 2021 05:26), Max: 36.3 (17 Jun 2021 05:26)  T(F): 97.4 (17 Jun 2021 05:26), Max: 97.4 (17 Jun 2021 05:26)  HR: 80 (17 Jun 2021 09:14) (39 - 170)  BP: 98/84 (17 Jun 2021 09:14) (34/13 - 170/87)  BP(mean): 87 (17 Jun 2021 09:14) (18 - 108)  RR: 21 (17 Jun 2021 09:14) (15 - 30)  SpO2: 100% (17 Jun 2021 09:14) (96% - 100%)  Mode: AC/ CMV (Assist Control/ Continuous Mandatory Ventilation)  RR (machine): 20  TV (machine): 460  FiO2: 40  PEEP: 5  ITime: 1.2  MAP: 11  PC: 20  PIP: 29    I&O's Summary      PHYSICAL EXAM:  General: intubated, on fentanyl gtt   Neuro: GCS 3T  Respiratory: airway secured with ETT, mechanically ventilated, synchronous with vent  CVS: hypotensive requiring IABP with vasopressor support, extremities cool  Abdomen: softly distended      LABS:                        8.4    12.15 )-----------( 99       ( 17 Jun 2021 12:14 )             29.1     06-17    148<H>  |  105  |  21  ----------------------------<  383<H>  4.8   |  12<L>  |  1.73<H>    Ca    8.1<L>      17 Jun 2021 10:34  Phos  9.2     06-17  Mg     3.0     06-17    TPro  4.1<L>  /  Alb  2.0<L>  /  TBili  0.9  /  DBili  x   /  AST  203<H>  /  ALT  178<H>  /  AlkPhos  111  06-17    Lactate:    PT/INR - ( 17 Jun 2021 06:26 )   PT: 27.2 sec;   INR: 2.47 ratio         PTT - ( 17 Jun 2021 06:26 )  PTT:40.7 sec  ABG - ( 17 Jun 2021 12:14 )  pH, Arterial: 6.92  pH, Blood: x     /  pCO2: 34    /  pO2: 129   / HCO3: 7     / Base Excess: -23.2 /  SaO2: 94.5              CARDIAC MARKERS ( 17 Jun 2021 10:51 )  x     / x     / 298 U/L / x     / 20.9 ng/mL            IMAGING:    < from: CT Abdomen and Pelvis w/wo IV Cont (06.17.21 @ 10:46) >  FINDINGS:  CHEST:  LUNGS AND LARGE AIRWAYS: Endotracheal tube tip is above the errol. No endobronchial lesion. Patchy ground glass opacities within the right lung may represent pulmonary contusion/hemorrhage. Bibasilar and linear subsegmental atelectasis  PLEURA: No pneumothorax. No pleural effusions.  VESSELS: Intra-aortic balloon pump. `Scattered foci of air within the bilateral veins in the supraclavicular region.  HEART: Cardiomegaly. Small amount of high density pericardial effusion, likely hemopericardium. Right chest two lead AICD..  MEDIASTINUM AND CRISTOBAL: No lymphadenopathy.  CHEST WALL AND LOWER NECK: Within normal limits.    ABDOMEN AND PELVIS:  LIVER: Linear segment 4 hepatic laceration measuring up to 5 cm. (10:99). Additional 2.5 cm focus of intrahepatic contrast extravasation, contiguous with the middle hepatic vein (10:102). Intrahepatic portal venous gas. No subcapsular hematoma.  BILE DUCTS: Normal caliber.  GALLBLADDER: Contracted gallbladder with nonspecific gallbladder wall thickening and mucosal enhancement.  SPLEEN: Within normal limits.  PANCREAS: Small 1.8 cm region of parenchymal hypoenhancement within the body of the pancreas, with trace peripancreatic fluid and fat stranding.  ADRENALS: Nodular thickening of bilateral adrenal glands.  KIDNEYS/URETERS: Within normal limits.    BLADDER: Within normal limits.  REPRODUCTIVE ORGANS: Prostate within normal limits.    BOWEL: No bowel obstruction or wall thickening. Normal enhancement. No pneumatosis.. Appendix is not visualized. No evidence of inflammation in the pericecal region.  PERITONEUM: Trace ascites. No hemoperitoneum or pneumoperitoneum.  VESSELS: Right femoral approach intra-aortic balloon pump. Left femoral central venous catheter tip is in the IVC. Trace air within the IVC and left renal vein.  RETROPERITONEUM/LYMPH NODES: No lymphadenopathy.  ABDOMINAL WALL: Small fat-containing left inguinal hernia.  BONES: Sternal fracture. No rib fractures.    IMPRESSION:  Right lung pulmonary contusion/hemorrhage. No hemothorax or pneumothorax.    Small hemopericardium.    Segment 4 hepatic laceration and additional focus of intrahepatic contrast extravasation.No subcapsular hematoma. No hemoperitoneum or pneumoperitoneum.    Pancreatic body parenchymal hypoenhancement with peripancreatic fat stranding, concerning for pancreatic injury.    Sternal fracture.    < end of copied text >   GENERAL SURGERY CONSULT NOTE    Consulting surgical team: B Team Surgery [id number]  Consulting attending: Dr. Zain Buck    HPI:  66yo M w/ h/o HFrEF (EF 15% 2013), HTN, HLD, CAD s/p SHAMA x3, AICD, on Xarelto (since 2/2021) p/w diffuse abdominal discomfort since the morning prior to admission. At time of encounter pt intubated and sedated. Per chart review pt w/ h/o C in 2013 which revealed severe LV systolic dysfunction and 100% stenosis of his RPDA. Pt underwent stenting and was discharged on a Life Vest. Following discharge the patient was shocked by his Life Vest, so he was transitioned to an AICD ([employer]). Per family at bedside (daughters and girlfriend) he had complained of abdominal pain the weekend prior to admission after "drinking juice" associated with poor PO intake.     IN THE ED: During transport, pt reportedly noted to be in SVT to 174 on tele; personal review of EKG at that time w/ VT. Pt given 5mg metoprolol at that time. Per ED note: Patient's arm started twitching he made gasping respirations and then suddenly became hypotensive. Patient cardioverted @100j cardioversion attempted with recovery of paced rhythm but with no pulse with sudden loss of respirations/Mental status CPR was started Patient was intubated and patient received 3mg epi x3, amp bicarb and atropine given with ROSC obtained after approx 15min). Patient still hypotensive so started on norepi gtt w/ fent gtt for sedation and 300mg amio for refractory VT/VF Of note patient AICD fired twice without conversion despite amio and with abd pain n,v diuretic hx 2g of mag given.  Patient continues to have episodes of arrest abated by EPI and increasing drip. MICU and CCU consulted given massive pre cpr troponin spill. patient accepted to CCU. Central line placed for additional access (of note Left IJ thrombus seen on pre-CVL US) and additional pressors (norepi/melanie).(17 Jun 2021 11:45)    Patient s/p CPR with ADRIANO device. CT post-CPR demonstrated liver laceration with possible active extravasation from middle hepatic vein, pancreatic injury, sternal fracture, and pulmonary contusion. Hgb drop from 12 to 7.8.    PAST MEDICAL HISTORY:  Essential Hypertension    CAD (Coronary Artery Disease)    Hyperlipidemia    Myocardial infarct, old        PAST SURGICAL HISTORY:  Non-ST Elevation Myocardial Infarction (NSTEMI)    Stented coronary artery        MEDICATIONS:  chlorhexidine 0.12% Liquid 15 milliLiter(s) Oral Mucosa every 12 hours  fentaNYL   Infusion. 0.5 MICROgram(s)/kG/Hr IV Continuous <Continuous>  norepinephrine Infusion 0.05 MICROgram(s)/kG/Min IV Continuous <Continuous>  pantoprazole Infusion 8 mG/Hr IV Continuous <Continuous>  vasopressin Infusion 0.02 Unit(s)/Min IV Continuous <Continuous>      ALLERGIES:  No Known Allergies      VITALS & I/Os:  Vital Signs Last 24 Hrs  T(C): 36.3 (17 Jun 2021 05:26), Max: 36.3 (17 Jun 2021 05:26)  T(F): 97.4 (17 Jun 2021 05:26), Max: 97.4 (17 Jun 2021 05:26)  HR: 80 (17 Jun 2021 09:14) (39 - 170)  BP: 98/84 (17 Jun 2021 09:14) (34/13 - 170/87)  BP(mean): 87 (17 Jun 2021 09:14) (18 - 108)  RR: 21 (17 Jun 2021 09:14) (15 - 30)  SpO2: 100% (17 Jun 2021 09:14) (96% - 100%)  Mode: AC/ CMV (Assist Control/ Continuous Mandatory Ventilation)  RR (machine): 20  TV (machine): 460  FiO2: 40  PEEP: 5  ITime: 1.2  MAP: 11  PC: 20  PIP: 29    I&O's Summary      PHYSICAL EXAM:  General: intubated, on fentanyl gtt   Neuro: GCS 3T  Respiratory: airway secured with ETT, mechanically ventilated, synchronous with vent  CVS: hypotensive requiring IABP with vasopressor support, extremities cool  Abdomen: softly distended, NGT with blood tinged output      LABS:                        8.4    12.15 )-----------( 99       ( 17 Jun 2021 12:14 )             29.1     06-17    148<H>  |  105  |  21  ----------------------------<  383<H>  4.8   |  12<L>  |  1.73<H>    Ca    8.1<L>      17 Jun 2021 10:34  Phos  9.2     06-17  Mg     3.0     06-17    TPro  4.1<L>  /  Alb  2.0<L>  /  TBili  0.9  /  DBili  x   /  AST  203<H>  /  ALT  178<H>  /  AlkPhos  111  06-17    Lactate:    PT/INR - ( 17 Jun 2021 06:26 )   PT: 27.2 sec;   INR: 2.47 ratio         PTT - ( 17 Jun 2021 06:26 )  PTT:40.7 sec  ABG - ( 17 Jun 2021 12:14 )  pH, Arterial: 6.92  pH, Blood: x     /  pCO2: 34    /  pO2: 129   / HCO3: 7     / Base Excess: -23.2 /  SaO2: 94.5              CARDIAC MARKERS ( 17 Jun 2021 10:51 )  x     / x     / 298 U/L / x     / 20.9 ng/mL            IMAGING:    < from: CT Abdomen and Pelvis w/wo IV Cont (06.17.21 @ 10:46) >  FINDINGS:  CHEST:  LUNGS AND LARGE AIRWAYS: Endotracheal tube tip is above the errol. No endobronchial lesion. Patchy ground glass opacities within the right lung may represent pulmonary contusion/hemorrhage. Bibasilar and linear subsegmental atelectasis  PLEURA: No pneumothorax. No pleural effusions.  VESSELS: Intra-aortic balloon pump. `Scattered foci of air within the bilateral veins in the supraclavicular region.  HEART: Cardiomegaly. Small amount of high density pericardial effusion, likely hemopericardium. Right chest two lead AICD..  MEDIASTINUM AND CRISTOBAL: No lymphadenopathy.  CHEST WALL AND LOWER NECK: Within normal limits.    ABDOMEN AND PELVIS:  LIVER: Linear segment 4 hepatic laceration measuring up to 5 cm. (10:99). Additional 2.5 cm focus of intrahepatic contrast extravasation, contiguous with the middle hepatic vein (10:102). Intrahepatic portal venous gas. No subcapsular hematoma.  BILE DUCTS: Normal caliber.  GALLBLADDER: Contracted gallbladder with nonspecific gallbladder wall thickening and mucosal enhancement.  SPLEEN: Within normal limits.  PANCREAS: Small 1.8 cm region of parenchymal hypoenhancement within the body of the pancreas, with trace peripancreatic fluid and fat stranding.  ADRENALS: Nodular thickening of bilateral adrenal glands.  KIDNEYS/URETERS: Within normal limits.    BLADDER: Within normal limits.  REPRODUCTIVE ORGANS: Prostate within normal limits.    BOWEL: No bowel obstruction or wall thickening. Normal enhancement. No pneumatosis.. Appendix is not visualized. No evidence of inflammation in the pericecal region.  PERITONEUM: Trace ascites. No hemoperitoneum or pneumoperitoneum.  VESSELS: Right femoral approach intra-aortic balloon pump. Left femoral central venous catheter tip is in the IVC. Trace air within the IVC and left renal vein.  RETROPERITONEUM/LYMPH NODES: No lymphadenopathy.  ABDOMINAL WALL: Small fat-containing left inguinal hernia.  BONES: Sternal fracture. No rib fractures.    IMPRESSION:  Right lung pulmonary contusion/hemorrhage. No hemothorax or pneumothorax.    Small hemopericardium.    Segment 4 hepatic laceration and additional focus of intrahepatic contrast extravasation.No subcapsular hematoma. No hemoperitoneum or pneumoperitoneum.    Pancreatic body parenchymal hypoenhancement with peripancreatic fat stranding, concerning for pancreatic injury.    Sternal fracture.    < end of copied text >

## 2021-06-17 NOTE — ED ADULT NURSE NOTE - NSIMPLEMENTINTERV_GEN_ALL_ED
Implemented All Fall with Harm Risk Interventions:  [place] to call system. Call bell, personal items and telephone within reach. Instruct patient to call for assistance. Room bathroom lighting operational. Non-slip footwear when patient is off stretcher. Physically safe environment: no spills, clutter or unnecessary equipment. Stretcher in lowest position, wheels locked, appropriate side rails in place. Provide visual cue, wrist band, yellow gown, etc. Monitor gait and stability. Monitor for mental status changes and reorient to person, place, and time. Review medications for side effects contributing to fall risk. Reinforce activity limits and safety measures with patient and family. Provide visual clues: red socks.

## 2021-06-17 NOTE — CONSULT NOTE ADULT - ASSESSMENT
64yo M w/ h/o HFrEF (EF 15% 2013), HTN, HLD, CAD s/p SHAMA x3, AICD, on Xarelto (since 2/2021) p/w diffuse abdominal discomfort since the morning prior to admission. Hospital course c/b VT and cardiac arrest requiring intubation and pressor support. Patient admitted to CCU for further management. ICD interrogation revealed episodes of VF with ICD shocks (see ICD interrogation note)    # VT /Cardiac Arrest/ shock  -abdominal pain, ischemic vs VT in setting of advanced LV dysfunction and known systolic CHF  -refractory VT and subsequent cardiac arrest  -prolonged/recurrent CPR with ADRIANO device  -s/p IABP for hemodynamic support  -now intubated, on vasopressor support  -acidotic, lactate 21    # Anemia  -CT noted for right pulm hemothroax, liver laceration and possible pancreatic injury    EP was consulted for VT .Ppatient had further cardiac arrests while in CCU and is made DNR/DNI  Management as per CCU team  No EP interventions at this time 64yo M w/ h/o HFrEF (EF 15% 2013), HTN, HLD, CAD s/p SHAMA x3, AICD, on Xarelto (since 2/2021) p/w diffuse abdominal discomfort since the morning prior to admission. Hospital course c/b VT and cardiac arrest requiring intubation and pressor support. Patient admitted to CCU for further management. ICD interrogation revealed episodes of VF with ICD shocks (see ICD interrogation note)    # VT /Cardiac Arrest/ shock  -abdominal pain, ischemic vs VT in setting of advanced LV dysfunction and known systolic CHF  -refractory VT and subsequent cardiac arrest  -prolonged/recurrent CPR with ADRIANO device  -s/p IABP for hemodynamic support  -now intubated, on vasopressor support  -acidotic, lactate 21    # Anemia  -CT noted for right pulm hemothroax, liver laceration and possible pancreatic injury    EP was consulted for VT. Patient had further cardiac arrests while in CCU and is made DNR/DNI  Management as per CCU team  No EP interventions at this time

## 2021-06-17 NOTE — ED ADULT NURSE REASSESSMENT NOTE - NS ED NURSE REASSESS COMMENT FT1
Pt. transported to the cath lab at 9:10am with respiratory and cardiologist. Rpt given to ASHLEY Augilera in CCU. SELWYN Garcia and CCU RN made aware that no med orders were placed during code which was before my shift. Meds documented in Code Flowsheet only. Pt. transported to the cath lab at 9:10am with respiratory and cardiologist. Rpt given to ASHLEY Aguilera in CCU.

## 2021-06-17 NOTE — CHART NOTE - NSCHARTNOTEFT_GEN_A_CORE
Patient with JESSICA to Cr 2.1  He requires emergent CT with contrast to evaluate for mechanical complication with extravastion of contrast.   Benefits outweigh risks and family aware  Will call nephrology

## 2021-06-17 NOTE — ED PROVIDER NOTE - ATTENDING CONTRIBUTION TO CARE
I have seen and examined the patient on the patient´s visit date. I have reviewed the note written by the resident Dimas Licona (Resident) (Resident) on that visit day. I have supervised participated and duplicated and confirmed all aspects of the physical exam as necessary, I performed all procedures indicated for patient management and was available at all phases of the patient´s visit when needed. We discussed the history, physical exam findings, management plan, and  medical decision making. I have made my additions, exceptions, and revisions within the chart and I agree with H and P as documented in its entirety. The data and my interpretation of any data collected from labs, interventions and imaging appear below as well as my independent medical decision making and considerations  CRITICAL CARE TIME WAS NECESSARY TO TREAT OR PREVENT LIFE THREATENING DETERIORATION FROM THE FOLLOWING CONDITIONS:  [X  ] Heart Failure and/or Circulatory Collapse [  ] Sepsis [ X ] Respiratory failure  [  ]CNS Failure or Compromise    [  ]Dehydration [  ]Renal Failure [  ]Hepatic Failure Sepsis [  ]Endocrine Crisis  [  ]Metabolic Crisis  [  ]Toxidrome   [  ]Trauma    CRITICAL CARE TIME WAS SPENT BY ME IN THE FOLLOWING ACTIVITIES:  [X  ] Performance of the History and Physical Examination [X  ] Review of Old Charts [ X  ] IV Access and or Obtaining Necessary Diagnostic Tests   [X  ] Ordering and Performing Treatments and Interventions:   Specifically:  [ X ] Ventilator Management [X  ] Vascular Access Procedures [  ] NGT Placement  [  ] Transcutaneous Pacing  [ X ] Establishment of Goals of Care with the Patient or Their Designated Representative  [X  ] Developing and Evaluating Treatment Plan with Consultants and/or the Primary Provider  [  ] Serial Reevaluation of the Patients Condition and Response to Therapeutic Measures   Specifically: [ X ] Interpretation of Cardiac and Respiratory Parameters  [X  ]Ordering and Interpretating  Diagnostic Studies    Comments:  The patient is a 65y Male who has a past medical and surgery history of  who has a past medical history of  life vest use AICD placement for primary prevention of SCD/sCHF EF 20%, mild AI, mild-mod MR, mod LAE, severe LV dysfunction, nl RV, w/7/10/13 CAD s/p PCI stent to RPL1 PTED with sudden onset abdominal pain as described   Patient initially seen in spot 29   /69 mm Hg HR 89 /min RR 15 /min T 97.4 Degrees F Pox 100 %RA when examined by resident HR noted to be >150 so EKG obtained which showed UCAF RBBB pattern (?aged AS Infarct)  PE: as described; bi JVD: clear lungs at time of evaluation abdomen tense distended ext chronic changes no edema  DATA:  EKG: as described   LABS: Pending at time of evaluation     MDM:   IMPRESSION: Abdominal pain; with SVT borderline BP; [id number]'s  Considerations; DDX PUD pancreatitis HB disease bowel ischemia PE all of which could cause secondary UCAF; also stomach pain could also=anginal equivelant and be 2/2 MI AAA possibilty    PLAN  Rate control goal [id number] while maintainiing BP; Patient on metoprolol standing; as pt appears dry and multiple etiologies involve low volume will give 1 Liter while repeating lung exam q250 cc  trops lipase lfts hct serial abdominal exam  CXR  CTA c/a/p eval for gut ischemia PE and aorta  Reassess

## 2021-06-17 NOTE — DISCHARGE NOTE FOR THE EXPIRED PATIENT - OTHER SIGNIFICANT FINDINGS
LABS:                       8.4    12.15 )-----------( 99       ( 17 Jun 2021 12:14 )             29.1     06-17    145  |  103  |  23  ----------------------------<  324<H>  6.1<H>   |  <7<LL>  |  2.66<H>    Ca    8.0<L>      17 Jun 2021 12:14  Phos  12.0     06-17  Mg     3.3     06-17    TPro  4.6<L>  /  Alb  2.0<L>  /  TBili  1.1  /  DBili  x   /  AST  471<H>  /  ALT  394<H>  /  AlkPhos  131<H>  06-17    LIVER FUNCTIONS - ( 17 Jun 2021 12:14 )  Alb: 2.0 g/dL / Pro: 4.6 g/dL / ALK PHOS: 131 U/L / ALT: 394 U/L / AST: 471 U/L / GGT: x           PT/INR - ( 17 Jun 2021 13:48 )   PT: 39.2 sec;   INR: 3.66 ratio         PTT - ( 17 Jun 2021 13:48 )  PTT:48.5 sec

## 2021-06-17 NOTE — ED ADULT NURSE REASSESSMENT NOTE - NS ED NURSE REASSESS COMMENT FT1
Received rpt from ASHLEY Garcia and [place]. Pt. currently in Tr-A, NGT in place, 2 IV lines being accessed in left and right AC. Levophed and Fentanyl IVPB running as ordered. Pt. hypotensive. Cardiologist and ER MD [place] at bedside. Resident to put in central line to left femoral. Advised to give Epinephrine and Sodium Bicarb IVP. Awaiting respiratory for Cath Lab transport.

## 2021-06-17 NOTE — ED ADULT TRIAGE NOTE - CHIEF COMPLAINT QUOTE
Pt c/o abdominal pain with nausea since yesterday after eating eggs.  Denies any diarrhea/urinary symptoms.  Abdomen mildly distended.  PMHx:  MI, CAD, hyperlipidemia, HTN

## 2021-06-17 NOTE — H&P ADULT - NSHPLABSRESULTS_GEN_ALL_CORE
LABS:                     8.4    12.15 )-----------( 99       ( 17 Jun 2021 12:14 )             29.1     06-17    145  |  103  |  23  ----------------------------<  324<H>  6.1<H>   |  x   |  2.66<H>    Ca    8.0<L>      17 Jun 2021 12:14  Phos  12.0     06-17  Mg     3.3     06-17    TPro  4.6<L>  /  Alb  2.0<L>  /  TBili  1.1  /  DBili  x   /  AST  471<H>  /  ALT  394<H>  /  AlkPhos  131<H>  06-17    LIVER FUNCTIONS - ( 17 Jun 2021 12:14 )  Alb: 2.0 g/dL / Pro: 4.6 g/dL / ALK PHOS: 131 U/L / ALT: 394 U/L / AST: 471 U/L / GGT: x           PT/INR - ( 17 Jun 2021 06:26 )   PT: 27.2 sec;   INR: 2.47 ratio         PTT - ( 17 Jun 2021 06:26 )  PTT:40.7 sec

## 2021-06-17 NOTE — ED PROVIDER NOTE - PROGRESS NOTE DETAILS
Patient noted to be in SVT to 174 on tele and EKG showing SVT with RBBB. 5 metop given. Patient hypotensive so 1L NS started. 5mg metop given. Patient's arm started twitching and he became hypotensive.  100j cardioversion attempted. rhythm briefly returned then paused. Patient pulseless CPR started. Patient received 3mg epi x3, amp bicarb. Patient intubated. Atropine given. ROSC obtained. (approx 15min). Patient started on norepi gtt and fent gtt. 300mg amio given. 2g mag given. Pulse lost again briefly. ROSC obtained approx 3min. Patient stabilized. Patient noted to be in SVT to 174 on tele and EKG showing SVT with RBBB. 5 metop given. Patient hypotensive so 1L NS started. 5mg metop given. Patient's arm started twitching and he became hypotensive.  100j cardioversion attempted. rhythm briefly returned then paused. Patient pulseless CPR started. Patient received 3mg epi x3, amp bicarb. Patient intubated. Atropine given. ROSC obtained.(approx 15min). Patient started on norepi gtt and fent gtt. 300mg amio given. 2g mag given. Pulse lost again briefly. ROSC obtained approx 3min. Intermittent runs of vfib noted. Patient stabilized on pressors. MICU and CCU consulted. Patient moved from RM 29 to 20 Patient noted to be in SVT to 174 on tele and EKG showing SVT with RBBB. 5 metop given with pacer pads  and ivfs running Patient initially hypotensive but normalized so. 5mg metop given. Patient's arm started twitching he made gasping respirations and then suddenly became hypotensive Patient cardioverted @100j cardioversion attempted with recovery of paced rhythm but with no pulse with sudden loss of respirations/Mental status CPR was started Patient was intubated and patient received 3mg epi x3, amp bicarb and atropine given with ROSC obtained after approx 15min). Patient still hypotensive so started on norepi gtt w/ fent gtt for sedation and 300mg amio for refractory VT/VF Of note patient AICD fired twice without conversion despite amio and with abd pain n,v diuretic hx 2g of mag given.  Patient continues to have episodes of arrest abated by EPI and increasing drip. MICU and CCU consulted given massive pre cpr troponin spill patient accepted to CCU. Central line placed for additional access (of note Left IJ thrombus seen on pre-CVL US) and additional pressors (norepi/melanie)

## 2021-06-17 NOTE — PROCEDURE NOTE - ADDITIONAL PROCEDURE DETAILS
Appropriate pacing and sensing at this time. Threshold not checked. Episodes of VT/VF today corresponding to this admission. No reprogramming done.

## 2021-06-17 NOTE — PROCEDURE NOTE - INTERROGATION NOTE: COMMENTS
Episodes of VF with 3 ICD shocks, multiple episodes of non sustained V oversensing, appears to be asystole or fine VF with no therapy; episodes of VT with no therapy

## 2021-06-17 NOTE — CONSULT NOTE ADULT - ATTENDING COMMENTS
GI consulted for red blood per OGT. Patient critically ill in CCU after complicated course after presentation, including cardiac arrest. Now intubated, sedated, on pressors and with IABP. Patient unable to provide history as intubated and sedated. Abdomen soft, but distended. OGT with bright red blood. Labs with multiple abnormalities, including significant lactate elevation, downtrending Hgb (12s to 7/8s), thrombocytopenia, coagulopathy, worsening renal function and hyperkalemia, elevated liver tests. CT with pulmonary contusion/hemorrhage, hemopericardium, liver laceration with focus of intrahepatic contrast extravasation and possible pancreatic injury.   Patient currently critically ill with guarded prognosis. Multiple source of blood loss likely secondary to trauma of compressions from recent cardiac arrest; bleeding likely exacerbated in setting of coagulopathy.   Patient not currently stable for endoscopic evaluation; risks at this time outweigh foreseeable benefits. Recommend supportive care, PPI and GOC with family.

## 2021-06-17 NOTE — H&P ADULT - HISTORY OF PRESENT ILLNESS
64yo M w/ h/o HFrEF (EF 15% 2013), HTN, HLD, CAD s/p SHAMA x3, AICD, on Xarelto (he and wife were unsure why) p/w diffuse abdominal discomfort (he could not characterize the pain) since the morning prior to admission. Per chart review pt w/ h/o C in 2013 which revealed severe LV systolic dysfunction and 100% stenosis of his RPDA. Pt underwent stenting and was placed on a Life Vest.  66yo M w/ h/o HFrEF (EF 15% 2013), HTN, HLD, CAD s/p SHAMA x3, AICD, on Xarelto (he and wife were unsure why) p/w diffuse abdominal discomfort (he could not characterize the pain) since the morning prior to admission. Per chart review pt w/ h/o C in 2013 which revealed severe LV systolic dysfunction and 100% stenosis of his RPDA. Pt underwent stenting and was discharged on a Life Vest. Following discharge the patient was shocked by his Life Vest, so he was transitioned to an AICD ([employer]). 66yo M w/ h/o HFrEF (EF 15% 2013), HTN, HLD, CAD s/p SHAMA x3, AICD, on Xarelto (since 2/2021) p/w diffuse abdominal discomfort since the morning prior to admission. At time of encounter pt intubated and sedated. Per chart review pt w/ h/o [facility] in 2013 which revealed severe LV systolic dysfunction and 100% stenosis of his RPDA. Pt underwent stenting and was discharged on a Life Vest. Following discharge the patient was shocked by his Life Vest, so he was transitioned to an AICD ([employer]). Per family at bedside (daughters and girlfriend) he had complained of abdominal pain the weekend prior to admission after "drinking juice" associated with poor PO intake.  64yo M w/ h/o HFrEF (EF 15% 2013), HTN, HLD, CAD s/p SHAMA x3, AICD, on Xarelto (since 2/2021) p/w diffuse abdominal discomfort since the morning prior to admission. At time of encounter pt intubated and sedated. Per chart review pt w/ h/o C in 2013 which revealed severe LV systolic dysfunction and 100% stenosis of his RPDA. Pt underwent stenting and was discharged on a Life Vest. Following discharge the patient was shocked by his Life Vest, so he was transitioned to an AICD ([employer]). Per family at bedside (daughters and girlfriend) he had complained of abdominal pain the weekend prior to admission after "drinking juice" associated with poor PO intake.     IN THE ED: During transport, pt reportedly noted to be in SVT to 174 on tele; personal review of EKG at that time w/ VT. Pt given 5mg metoprolol at that time. Per ED note: Patient's arm started twitching he made gasping respirations and then suddenly became hypotensive. Patient cardioverted @100j cardioversion attempted with recovery of paced rhythm but with no pulse with sudden loss of respirations/Mental status CPR was started Patient was intubated and patient received 3mg epi x3, amp bicarb and atropine given with ROSC obtained after approx 15min). Patient still hypotensive so started on norepi gtt w/ fent gtt for sedation and 300mg amio for refractory VT/VF Of note patient AICD fired twice without conversion despite amio and with abd pain n,v diuretic hx 2g of mag given.  Patient continues to have episodes of arrest abated by EPI and increasing drip. MICU and CCU consulted given massive pre cpr troponin spill. patient accepted to CCU. Central line placed for additional access (of note Left IJ thrombus seen on pre-CVL US) and additional pressors (norepi/melanie).   64yo M w/ h/o HFrEF (EF 15% 2013), HTN, HLD, CAD s/p SHAMA x3, AICD, on Xarelto (since 2/2021) p/w diffuse abdominal discomfort since the morning prior to admission. At time of encounter pt intubated and sedated. Per chart review pt w/ h/o C in 2013 which revealed severe LV systolic dysfunction and 100% stenosis of his RPDA. Pt underwent stenting and was discharged on a Life Vest. Following discharge the patient was shocked by his Life Vest, so he was transitioned to an AICD ([employer]). Per family at bedside (daughters and girlfriend) he had complained of abdominal pain the weekend prior to admission after "drinking juice" associated with poor PO intake.     IN THE ED: During transport, pt reportedly noted to be in SVT to 174 on tele; personal review of EKG at that time w/ VT. Pt given 5mg metoprolol at that time. Per ED note: Patient's arm started twitching he made gasping respirations and then suddenly became hypotensive. Patient cardioverted @100j cardioversion attempted with recovery of paced rhythm but with no pulse with sudden loss of respirations/Mental status CPR was started Patient was intubated and patient received 3mg epi x3, amp bicarb and atropine given with ROSC obtained after approx 15min). Patient still hypotensive so started on norepi gtt w/ fent gtt for sedation and 300mg amio for refractory VT/VF Of note patient AICD fired twice without conversion despite amio and with abd pain n,v diuretic hx 2g of mag given.  Patient continues to have episodes of arrest abated by EPI and increasing drip. MICU and CCU consulted given massive pre cpr troponin spill. patient accepted to CCU. Central line placed for additional access (of note Left IJ thrombus seen on pre-CVL US) and additional pressors (norepi/melanie).    Following ROSC in the ED, pt taken by CCU immediately to the cath lab for balloon pump placement given cardiogenic shock. At time of balloon pump placement blood was noted to be very "light/pale". Hgb at that time was found to be significantly reduced relative to admission labs (12 --> 7), so pt was taken for urgent CT chest/abd/pelvis w/ IV contrast to eval for possible bleeding. CT significant for: Segment 4 hepatic laceration and additional focus of intrahepatic contrast extravasation. No subcapsular hematoma. No hemoperitoneum or pneumoperitoneum. Right lung pulmonary contusion/hemorrhage. No hemothorax or pneumothorax. Small hemopericardium. Pancreatic body parenchymal hypoenhancement with peripancreatic fat stranding, concerning for pancreatic injury. Surgery, GI, and IR consulted.

## 2021-06-17 NOTE — H&P ADULT - NSICDXPASTMEDICALHX_GEN_ALL_CORE_FT
PAST MEDICAL HISTORY:  CAD (Coronary Artery Disease)     Essential Hypertension     Hyperlipidemia     Myocardial infarct, old 6/13/11 STEMI, 08/2010 STEMI

## 2021-06-17 NOTE — ED PROCEDURE NOTE - ATTENDING CONTRIBUTION TO CARE
I was present to supervise, assist, and if necessary perform the procedure throughout its course.  CXR shows ETT in trachea but slightly high; conveyed to current care team

## 2022-04-19 NOTE — ED ADULT NURSE NOTE - NS ED NURSE RECORD ANOTHER VITAL SIGN
Yes Topical Sulfur Applications Counseling: Topical Sulfur Counseling: Patient counseled that this medication may cause skin irritation or allergic reactions.  In the event of skin irritation, the patient was advised to reduce the amount of the drug applied or use it less frequently.   The patient verbalized understanding of the proper use and possible adverse effects of topical sulfur application.  All of the patient's questions and concerns were addressed.

## 2023-07-08 NOTE — ED PROVIDER NOTE - ATTENDING CONTRIBUTION TO CARE
Attending Statement: I have personally seen and examined this patient. I have fully participated in the care of this patient. I have reviewed all pertinent clinical information, including history physical exam, plan and the Resident's note and agree except as noted
day(s)

## 2024-02-02 NOTE — CONSULT NOTE ADULT - TIME-BASED
Addended by: ROSELINE THOMAS on: 2/2/2024 12:55 PM     Modules accepted: Orders    
Addended by: RYDER BUCHANAN on: 2/2/2024 10:03 AM     Modules accepted: Orders    
70